# Patient Record
Sex: MALE | Race: ASIAN | NOT HISPANIC OR LATINO | URBAN - METROPOLITAN AREA
[De-identification: names, ages, dates, MRNs, and addresses within clinical notes are randomized per-mention and may not be internally consistent; named-entity substitution may affect disease eponyms.]

---

## 2021-07-24 ENCOUNTER — EMERGENCY (EMERGENCY)
Facility: HOSPITAL | Age: 31
LOS: 1 days | Discharge: HOME | End: 2021-07-24
Attending: EMERGENCY MEDICINE
Payer: COMMERCIAL

## 2021-07-24 VITALS
OXYGEN SATURATION: 99 % | WEIGHT: 145.06 LBS | TEMPERATURE: 98 F | HEART RATE: 94 BPM | RESPIRATION RATE: 16 BRPM | DIASTOLIC BLOOD PRESSURE: 90 MMHG | SYSTOLIC BLOOD PRESSURE: 133 MMHG | HEIGHT: 66 IN

## 2021-07-24 LAB
ALBUMIN SERPL ELPH-MCNC: 4.6 G/DL — SIGNIFICANT CHANGE UP (ref 3.3–5)
ALP SERPL-CCNC: 98 U/L — SIGNIFICANT CHANGE UP (ref 40–120)
ALT FLD-CCNC: 24 U/L — SIGNIFICANT CHANGE UP (ref 10–45)
ANION GAP SERPL CALC-SCNC: 15 MMOL/L — SIGNIFICANT CHANGE UP (ref 5–17)
APPEARANCE UR: CLEAR — SIGNIFICANT CHANGE UP
AST SERPL-CCNC: 20 U/L — SIGNIFICANT CHANGE UP (ref 10–40)
BACTERIA # UR AUTO: NEGATIVE — SIGNIFICANT CHANGE UP
BASOPHILS # BLD AUTO: 0.03 K/UL — SIGNIFICANT CHANGE UP (ref 0–0.2)
BASOPHILS NFR BLD AUTO: 0.4 % — SIGNIFICANT CHANGE UP (ref 0–2)
BILIRUB SERPL-MCNC: 0.4 MG/DL — SIGNIFICANT CHANGE UP (ref 0.2–1.2)
BILIRUB UR-MCNC: NEGATIVE — SIGNIFICANT CHANGE UP
BUN SERPL-MCNC: 24 MG/DL — HIGH (ref 7–23)
CALCIUM SERPL-MCNC: 9.8 MG/DL — SIGNIFICANT CHANGE UP (ref 8.4–10.5)
CHLORIDE SERPL-SCNC: 102 MMOL/L — SIGNIFICANT CHANGE UP (ref 96–108)
CO2 SERPL-SCNC: 25 MMOL/L — SIGNIFICANT CHANGE UP (ref 22–31)
COLOR SPEC: YELLOW — SIGNIFICANT CHANGE UP
CREAT SERPL-MCNC: 1.38 MG/DL — HIGH (ref 0.5–1.3)
DIFF PNL FLD: NEGATIVE — SIGNIFICANT CHANGE UP
EOSINOPHIL # BLD AUTO: 0.43 K/UL — SIGNIFICANT CHANGE UP (ref 0–0.5)
EOSINOPHIL NFR BLD AUTO: 5.3 % — SIGNIFICANT CHANGE UP (ref 0–6)
EPI CELLS # UR: 0 /HPF — SIGNIFICANT CHANGE UP
GLUCOSE SERPL-MCNC: 88 MG/DL — SIGNIFICANT CHANGE UP (ref 70–99)
GLUCOSE UR QL: NEGATIVE — SIGNIFICANT CHANGE UP
HCT VFR BLD CALC: 47.3 % — SIGNIFICANT CHANGE UP (ref 39–50)
HGB BLD-MCNC: 15.4 G/DL — SIGNIFICANT CHANGE UP (ref 13–17)
IMM GRANULOCYTES NFR BLD AUTO: 0.4 % — SIGNIFICANT CHANGE UP (ref 0–1.5)
KETONES UR-MCNC: NEGATIVE — SIGNIFICANT CHANGE UP
LEUKOCYTE ESTERASE UR-ACNC: NEGATIVE — SIGNIFICANT CHANGE UP
LYMPHOCYTES # BLD AUTO: 1.52 K/UL — SIGNIFICANT CHANGE UP (ref 1–3.3)
LYMPHOCYTES # BLD AUTO: 18.7 % — SIGNIFICANT CHANGE UP (ref 13–44)
MCHC RBC-ENTMCNC: 29.7 PG — SIGNIFICANT CHANGE UP (ref 27–34)
MCHC RBC-ENTMCNC: 32.6 GM/DL — SIGNIFICANT CHANGE UP (ref 32–36)
MCV RBC AUTO: 91.3 FL — SIGNIFICANT CHANGE UP (ref 80–100)
MONOCYTES # BLD AUTO: 0.59 K/UL — SIGNIFICANT CHANGE UP (ref 0–0.9)
MONOCYTES NFR BLD AUTO: 7.3 % — SIGNIFICANT CHANGE UP (ref 2–14)
NEUTROPHILS # BLD AUTO: 5.51 K/UL — SIGNIFICANT CHANGE UP (ref 1.8–7.4)
NEUTROPHILS NFR BLD AUTO: 67.9 % — SIGNIFICANT CHANGE UP (ref 43–77)
NITRITE UR-MCNC: NEGATIVE — SIGNIFICANT CHANGE UP
NRBC # BLD: 0 /100 WBCS — SIGNIFICANT CHANGE UP (ref 0–0)
PH UR: 6.5 — SIGNIFICANT CHANGE UP (ref 5–8)
PLATELET # BLD AUTO: 234 K/UL — SIGNIFICANT CHANGE UP (ref 150–400)
POTASSIUM SERPL-MCNC: 3.6 MMOL/L — SIGNIFICANT CHANGE UP (ref 3.5–5.3)
POTASSIUM SERPL-SCNC: 3.6 MMOL/L — SIGNIFICANT CHANGE UP (ref 3.5–5.3)
PROT SERPL-MCNC: 7.7 G/DL — SIGNIFICANT CHANGE UP (ref 6–8.3)
PROT UR-MCNC: ABNORMAL
RBC # BLD: 5.18 M/UL — SIGNIFICANT CHANGE UP (ref 4.2–5.8)
RBC # FLD: 12.2 % — SIGNIFICANT CHANGE UP (ref 10.3–14.5)
RBC CASTS # UR COMP ASSIST: 1 /HPF — SIGNIFICANT CHANGE UP (ref 0–4)
SODIUM SERPL-SCNC: 142 MMOL/L — SIGNIFICANT CHANGE UP (ref 135–145)
SP GR SPEC: 1.04 — HIGH (ref 1.01–1.02)
UROBILINOGEN FLD QL: ABNORMAL
WBC # BLD: 8.11 K/UL — SIGNIFICANT CHANGE UP (ref 3.8–10.5)
WBC # FLD AUTO: 8.11 K/UL — SIGNIFICANT CHANGE UP (ref 3.8–10.5)
WBC UR QL: 1 /HPF — SIGNIFICANT CHANGE UP (ref 0–5)

## 2021-07-24 PROCEDURE — 72100 X-RAY EXAM L-S SPINE 2/3 VWS: CPT | Mod: 26

## 2021-07-24 PROCEDURE — 93971 EXTREMITY STUDY: CPT | Mod: 26,LT

## 2021-07-24 PROCEDURE — 99285 EMERGENCY DEPT VISIT HI MDM: CPT

## 2021-07-24 RX ORDER — LIDOCAINE 4 G/100G
1 CREAM TOPICAL ONCE
Refills: 0 | Status: DISCONTINUED | OUTPATIENT
Start: 2021-07-24 | End: 2021-07-24

## 2021-07-24 RX ORDER — RIVAROXABAN 15 MG-20MG
1 KIT ORAL
Qty: 1 | Refills: 0
Start: 2021-07-24

## 2021-07-24 RX ORDER — KETOROLAC TROMETHAMINE 30 MG/ML
15 SYRINGE (ML) INJECTION ONCE
Refills: 0 | Status: DISCONTINUED | OUTPATIENT
Start: 2021-07-24 | End: 2021-07-24

## 2021-07-24 RX ORDER — LIDOCAINE 4 G/100G
1 CREAM TOPICAL ONCE
Refills: 0 | Status: COMPLETED | OUTPATIENT
Start: 2021-07-24 | End: 2021-07-24

## 2021-07-24 RX ADMIN — Medication 15 MILLIGRAM(S): at 20:27

## 2021-07-24 RX ADMIN — LIDOCAINE 1 PATCH: 4 CREAM TOPICAL at 20:23

## 2021-07-24 NOTE — ED ADULT NURSE NOTE - CHPI ED NUR SYMPTOMS NEG
no anorexia/no bladder dysfunction/no fatigue/no motor function loss/no neck tenderness/no numbness/no tingling

## 2021-07-24 NOTE — ED PROVIDER NOTE - NS ED ROS FT
Gen: Denies fever, chills  CV: Denies chest pain  Skin: Denies rash, erythema  Resp: Denies SOB, cough  ENT: Denies nasal congestion  Eyes: Denies blurry vision  GI: Denies nausea, vomiting, diarrhea  Msk: +lower back pain, LT LE swelling  : Denies dysuria  Neuro: Denies headache

## 2021-07-24 NOTE — ED PROVIDER NOTE - PATIENT PORTAL LINK FT
You can access the FollowMyHealth Patient Portal offered by [facility] by registering at the following website: [email or web address]. By joining [employer]’s FollowMyHealth portal, you will also be able to view your health information using other applications (apps) compatible with our system.

## 2021-07-24 NOTE — ED PROVIDER NOTE - OBJECTIVE STATEMENT
30 yo M with no pmhx presents with 9ds of LT sided back pain radiating to his left leg. No trauma. No hx of similar sxs. Seen by primary care physician - states xr showed stress fracture. Supposed to follow up with a spine specialist - did not go yet. Pain worsened today. Also concerned that he cannot sense when he has to urinate. No incontinence/retention however. Had fever 5ds ago with sore throat - started on abx - not sure about the name. Still taking them. Denies chest pain, shortness of breath, abd pain, n/v. Denies IV drug use, hx of CA. Lifts weight for workout - last workout was July 10th.   Also complains of swelling in LT leg. Travelled to [place] July 11th.

## 2021-07-24 NOTE — ED ADULT TRIAGE NOTE - CHIEF COMPLAINT QUOTE
back pain x 10 days radiating down left leg.  developed swelling and urinary incontinence a few days ago along with a fever.  went to PMD and was told he had stress fracture in spine and had negative covid test

## 2021-07-24 NOTE — ED ADULT NURSE NOTE - OBJECTIVE STATEMENT
31y Male AOx4 with no significant PMH presents to the ED c/o back pain. Pt states he started having back pain about 9 days ago, went to PCP who performed x-ray which showed "stress fracture". Pt states he was referred to see "a spine doctor next Tuesday but I couldn't wait till then". At this time pt c/o back pain radiating down left leg and noticed recent swelling of left leg. Denies recent trauma to area. Endorses recent fever, sore throat, and cough starting 7/20, does not remember name of antibiotic prescribed by PCP. Pt able to void without difficulty but states he feels he can't have full BMs. Currently using cane for ambulation. Sensation of lower extremities intact, palpable pedal pulses b/l. Denies N/V, SOB, chest pain. Spontaneous/unlabored respirations, speaking in full sentences. Side rails up, bed in lowest position, oriented to call bell, safety maintained.

## 2021-07-24 NOTE — ED PROVIDER NOTE - ATTENDING CONTRIBUTION TO CARE
Private Physician Kale Whitt DO PCP/[place],   31y male pmh Neg no dm,HTN,HLD,Covid,Cancer, cva, coronary artery disease, Pt returned from [place] 7/11/21. Pt employed as . Now comes to ed complains of LBP onset 9d ago. rad to left leg, Pt was seen by pmd and dx as Stress fx on plain film, referred to see spine MD next week. Now comes to ed with worsening pain. Pt had fever 4d ago tmax 101,with sore throat, pt was tx w abx, "can't recall name " Has mild cough, regino at night. Pt missed work on day of fever to see md. Pt also c/o not feeling he needs to urinate. Pain worse with moving leg. No buttock anaesthesia PE WDWN male mild distress, Heent normocephalic atraumatic neck supple chest clear anterior & posterior cv no rubs, gallops or murmurs abd soft pelvis stable MSK +slr 30 degrees left leg, distal sensaition/power intact, no obvious swelling. NO LS spine ttp/swelling rash.  Augusto Robbins MD, Facep .

## 2021-07-24 NOTE — ED PROVIDER NOTE - PROGRESS NOTE DETAILS
Endorsed to Dr MARIBEL Robbins MD, Facep Vi Kinsey,  PGY-3: pt is ambulatory - mildly antalgic gait. Return precautions of PE are discussed.

## 2021-07-24 NOTE — ED PROVIDER NOTE - PHYSICAL EXAMINATION
Gen: non toxic appearing, NAD   Head: NC/NT  Eyes:  anicteric  ENT: airway patent, mmm   CV: RRR, +S1/S2, no M/R/G, symmetric pulses   Resp: CTAB, symmetric breath sounds   GI:  abdomen soft non-distended, NTTP   Back: no spinal ttp, no mass appreciated, no increased warmth to touch  Extremities - subtle swelling noted to LT leg  Skin: no rashes, colors changes or bruising of back  Neuro: A&Ox4, following commands, speech clear, moving all four extremities spontaneously, 5/5 strength, sensation intact  Psych: appropriate mood, normal insight  Rectal tone intact - chaperone Kathy Hinojosa RN

## 2021-07-24 NOTE — ED ADULT NURSE REASSESSMENT NOTE - NS ED NURSE REASSESS COMMENT FT1
10 minutes after voiding, bladder scanned pt per MD request. Pt is retaining 26mL of urine. MD made aware

## 2021-07-24 NOTE — ED ADULT NURSE NOTE - NSIMPLEMENTINTERV_GEN_ALL_ED
Implemented All Fall Risk Interventions:  [place] to call system. Call bell, personal items and telephone within reach. Instruct patient to call for assistance. Room bathroom lighting operational. Non-slip footwear when patient is off stretcher. Physically safe environment: no spills, clutter or unnecessary equipment. Stretcher in lowest position, wheels locked, appropriate side rails in place. Provide visual cue, wrist band, yellow gown, etc. Monitor gait and stability. Monitor for mental status changes and reorient to person, place, and time. Review medications for side effects contributing to fall risk. Reinforce activity limits and safety measures with patient and family.

## 2021-07-24 NOTE — ED PROVIDER NOTE - NSFOLLOWUPINSTRUCTIONS_ED_ALL_ED_FT
You were seen for pain in your leg. You have a clot in your leg. Please take the blood thinner as prescribed.     Your creatinine is slightly elevated - please have your primary care physician repeat your labs.     Your results are attached with your discharge instructions, please review them with your primary care physician. If there is a result pending, you will receive a call if test is positive.    A presumptive diagnosis is made today, but further evaluation may be required by your primary care doctor and/or specialist for a definitive diagnosis. Therefore, follow up as directed and if symptoms change/worsen or any emergency conditions, please return to the ER.      If needed, call patient access services at [phone] to find a primary care doctor, or call at [phone] to make an appointment at the clinic.      Deep vein thrombosis (DVT) is a condition in which a blood clot forms in a deep vein, such as a lower leg, thigh, or arm vein.  Symptoms can include swelling, warmth, pain, and redness in your leg or arm.  This condition may be treated with a blood thinner (anticoagulant medicine), medicine that is injected to dissolve blood clots,compression stockings, or surgery.  If you are prescribed blood thinners, take them exactly as told.  Get help right away if:  You have:  New or increased pain, swelling, or redness in an arm or leg.  Numbness or tingling in an arm or leg.  Shortness of breath.  Chest pain.  A rapid or irregular heartbeat.  A severe headache or confusion.  A cut that will not stop bleeding.  There is blood in your vomit, stool, or urine.  You have a serious fall or accident, or you hit your head.  You feel light-headed or dizzy.  You cough up blood.    These symptoms may represent a serious problem that is an emergency. Do not wait to see if the symptoms will go away. Get medical help right away. Call your local emergency services (911 in the [place] Do not drive yourself to the hospital.

## 2021-07-25 VITALS
TEMPERATURE: 98 F | SYSTOLIC BLOOD PRESSURE: 124 MMHG | RESPIRATION RATE: 17 BRPM | HEART RATE: 83 BPM | DIASTOLIC BLOOD PRESSURE: 72 MMHG | OXYGEN SATURATION: 96 %

## 2021-07-25 LAB — SARS-COV-2 RNA SPEC QL NAA+PROBE: SIGNIFICANT CHANGE UP

## 2021-07-25 PROCEDURE — U0003: CPT

## 2021-07-25 PROCEDURE — U0005: CPT

## 2021-07-25 PROCEDURE — 85025 COMPLETE CBC W/AUTO DIFF WBC: CPT

## 2021-07-25 PROCEDURE — 96374 THER/PROPH/DIAG INJ IV PUSH: CPT

## 2021-07-25 PROCEDURE — 93971 EXTREMITY STUDY: CPT

## 2021-07-25 PROCEDURE — 99284 EMERGENCY DEPT VISIT MOD MDM: CPT | Mod: 25

## 2021-07-25 PROCEDURE — 80053 COMPREHEN METABOLIC PANEL: CPT

## 2021-07-25 PROCEDURE — 81001 URINALYSIS AUTO W/SCOPE: CPT

## 2021-07-25 PROCEDURE — 72100 X-RAY EXAM L-S SPINE 2/3 VWS: CPT

## 2021-07-25 RX ORDER — RIVAROXABAN 15 MG-20MG
15 KIT ORAL ONCE
Refills: 0 | Status: COMPLETED | OUTPATIENT
Start: 2021-07-25 | End: 2021-07-25

## 2021-07-25 RX ADMIN — RIVAROXABAN 15 MILLIGRAM(S): KIT at 01:17

## 2021-07-25 NOTE — ED POST DISCHARGE NOTE - DETAILS
7/25: spoke with patient regarding overread, has an appointment with spine on tuesday, wrote down results so he can discus the need for additional imaging with the spine physician - Ella Olvera PA-C

## 2021-08-01 ENCOUNTER — INPATIENT (INPATIENT)
Facility: HOSPITAL | Age: 31
LOS: 4 days | Discharge: HOME | DRG: 176 | End: 2021-08-06
Attending: INTERNAL MEDICINE | Admitting: INTERNAL MEDICINE
Payer: COMMERCIAL

## 2021-08-01 VITALS
HEIGHT: 66 IN | TEMPERATURE: 98 F | OXYGEN SATURATION: 100 % | RESPIRATION RATE: 18 BRPM | HEART RATE: 88 BPM | DIASTOLIC BLOOD PRESSURE: 77 MMHG | WEIGHT: 145.06 LBS | SYSTOLIC BLOOD PRESSURE: 117 MMHG

## 2021-08-01 DIAGNOSIS — I26.99 OTHER PULMONARY EMBOLISM WITHOUT ACUTE COR PULMONALE: ICD-10-CM

## 2021-08-01 PROBLEM — Z92.29 PERSONAL HISTORY OF OTHER DRUG THERAPY: Chronic | Status: ACTIVE | Noted: 2021-07-24

## 2021-08-01 LAB
ALBUMIN SERPL ELPH-MCNC: 4.2 G/DL — SIGNIFICANT CHANGE UP (ref 3.3–5)
ALP SERPL-CCNC: 103 U/L — SIGNIFICANT CHANGE UP (ref 40–120)
ALT FLD-CCNC: 22 U/L — SIGNIFICANT CHANGE UP (ref 10–45)
ANION GAP SERPL CALC-SCNC: 12 MMOL/L — SIGNIFICANT CHANGE UP (ref 5–17)
APTT BLD: 47.2 SEC — HIGH (ref 27.5–35.5)
AST SERPL-CCNC: 25 U/L — SIGNIFICANT CHANGE UP (ref 10–40)
BASOPHILS # BLD AUTO: 0.04 K/UL — SIGNIFICANT CHANGE UP (ref 0–0.2)
BASOPHILS NFR BLD AUTO: 0.5 % — SIGNIFICANT CHANGE UP (ref 0–2)
BILIRUB SERPL-MCNC: 0.3 MG/DL — SIGNIFICANT CHANGE UP (ref 0.2–1.2)
BUN SERPL-MCNC: 18 MG/DL — SIGNIFICANT CHANGE UP (ref 7–23)
CALCIUM SERPL-MCNC: 10.1 MG/DL — SIGNIFICANT CHANGE UP (ref 8.4–10.5)
CHLORIDE SERPL-SCNC: 103 MMOL/L — SIGNIFICANT CHANGE UP (ref 96–108)
CO2 SERPL-SCNC: 26 MMOL/L — SIGNIFICANT CHANGE UP (ref 22–31)
CREAT SERPL-MCNC: 1.01 MG/DL — SIGNIFICANT CHANGE UP (ref 0.5–1.3)
EOSINOPHIL # BLD AUTO: 0.23 K/UL — SIGNIFICANT CHANGE UP (ref 0–0.5)
EOSINOPHIL NFR BLD AUTO: 3 % — SIGNIFICANT CHANGE UP (ref 0–6)
GLUCOSE SERPL-MCNC: 94 MG/DL — SIGNIFICANT CHANGE UP (ref 70–99)
HCT VFR BLD CALC: 44.3 % — SIGNIFICANT CHANGE UP (ref 39–50)
HGB BLD-MCNC: 14.7 G/DL — SIGNIFICANT CHANGE UP (ref 13–17)
IMM GRANULOCYTES NFR BLD AUTO: 0.5 % — SIGNIFICANT CHANGE UP (ref 0–1.5)
INR BLD: 1.32 RATIO — HIGH (ref 0.88–1.16)
LYMPHOCYTES # BLD AUTO: 1.24 K/UL — SIGNIFICANT CHANGE UP (ref 1–3.3)
LYMPHOCYTES # BLD AUTO: 16.1 % — SIGNIFICANT CHANGE UP (ref 13–44)
MAGNESIUM SERPL-MCNC: 2.2 MG/DL — SIGNIFICANT CHANGE UP (ref 1.6–2.6)
MCHC RBC-ENTMCNC: 30.4 PG — SIGNIFICANT CHANGE UP (ref 27–34)
MCHC RBC-ENTMCNC: 33.2 GM/DL — SIGNIFICANT CHANGE UP (ref 32–36)
MCV RBC AUTO: 91.7 FL — SIGNIFICANT CHANGE UP (ref 80–100)
MONOCYTES # BLD AUTO: 0.41 K/UL — SIGNIFICANT CHANGE UP (ref 0–0.9)
MONOCYTES NFR BLD AUTO: 5.3 % — SIGNIFICANT CHANGE UP (ref 2–14)
NEUTROPHILS # BLD AUTO: 5.76 K/UL — SIGNIFICANT CHANGE UP (ref 1.8–7.4)
NEUTROPHILS NFR BLD AUTO: 74.6 % — SIGNIFICANT CHANGE UP (ref 43–77)
NRBC # BLD: 0 /100 WBCS — SIGNIFICANT CHANGE UP (ref 0–0)
PHOSPHATE SERPL-MCNC: 3.6 MG/DL — SIGNIFICANT CHANGE UP (ref 2.5–4.5)
PLATELET # BLD AUTO: 353 K/UL — SIGNIFICANT CHANGE UP (ref 150–400)
POTASSIUM SERPL-MCNC: 4.2 MMOL/L — SIGNIFICANT CHANGE UP (ref 3.5–5.3)
POTASSIUM SERPL-SCNC: 4.2 MMOL/L — SIGNIFICANT CHANGE UP (ref 3.5–5.3)
PROT SERPL-MCNC: 7.5 G/DL — SIGNIFICANT CHANGE UP (ref 6–8.3)
PROTHROM AB SERPL-ACNC: 15.6 SEC — HIGH (ref 10.6–13.6)
RBC # BLD: 4.83 M/UL — SIGNIFICANT CHANGE UP (ref 4.2–5.8)
RBC # FLD: 11.7 % — SIGNIFICANT CHANGE UP (ref 10.3–14.5)
SODIUM SERPL-SCNC: 141 MMOL/L — SIGNIFICANT CHANGE UP (ref 135–145)
TROPONIN T, HIGH SENSITIVITY RESULT: <6 NG/L — SIGNIFICANT CHANGE UP (ref 0–51)
WBC # BLD: 7.72 K/UL — SIGNIFICANT CHANGE UP (ref 3.8–10.5)
WBC # FLD AUTO: 7.72 K/UL — SIGNIFICANT CHANGE UP (ref 3.8–10.5)

## 2021-08-01 PROCEDURE — 71275 CT ANGIOGRAPHY CHEST: CPT | Mod: 26,MA

## 2021-08-01 PROCEDURE — 71046 X-RAY EXAM CHEST 2 VIEWS: CPT | Mod: 26

## 2021-08-01 PROCEDURE — 99291 CRITICAL CARE FIRST HOUR: CPT | Mod: 25

## 2021-08-01 PROCEDURE — 93010 ELECTROCARDIOGRAM REPORT: CPT

## 2021-08-01 RX ORDER — PANTOPRAZOLE SODIUM 20 MG/1
40 TABLET, DELAYED RELEASE ORAL
Refills: 0 | Status: DISCONTINUED | OUTPATIENT
Start: 2021-08-01 | End: 2021-08-06

## 2021-08-01 RX ORDER — ENOXAPARIN SODIUM 100 MG/ML
70 INJECTION SUBCUTANEOUS EVERY 12 HOURS
Refills: 0 | Status: DISCONTINUED | OUTPATIENT
Start: 2021-08-01 | End: 2021-08-06

## 2021-08-01 RX ADMIN — ENOXAPARIN SODIUM 70 MILLIGRAM(S): 100 INJECTION SUBCUTANEOUS at 23:39

## 2021-08-01 NOTE — H&P ADULT - PROBLEM SELECTOR PLAN 2
recently diagnosed with DVT , has long flight to [place] and then he develop DVT   was started on Xeralto since last Monday : says he didn't miss any dose  he only had venous doppler during that visit which was positive for DVT , now I'm not sure that his pul embolism was present during last week or not? though that time patient didn't have any SOB or pleuritic chest pain and today he develop that.   so this could be failure of Ac ( Xarelto ) and now he may need coumadin   will d/w hematology in am

## 2021-08-01 NOTE — ED ADULT NURSE NOTE - NSIMPLEMENTINTERV_GEN_ALL_ED
Implemented All Universal Safety Interventions:  [place] to call system. Call bell, personal items and telephone within reach. Instruct patient to call for assistance. Room bathroom lighting operational. Non-slip footwear when patient is off stretcher. Physically safe environment: no spills, clutter or unnecessary equipment. Stretcher in lowest position, wheels locked, appropriate side rails in place.

## 2021-08-01 NOTE — H&P ADULT - HISTORY OF PRESENT ILLNESS
31 M hx diagnosed provoked DVT (travelling, spent 24hrs stationary in airport otw to [place]) p/w SOB and mild chest discomfort since last night. Chest discomfort subcostal "need to take a deep breath". No diaphoresis/ N/V. No radiation of chest discomfort. No change with exertion. No cough, fevers, or chills. Pain in leg has been improving, but walking with a limp. Leg does not feel cold. Has been compliant with zarelto, albeit started it late d/t delay in obtaining it from pharmacy. No syncope, lightheaded.    He also notes a sore throat for the past week, odynophagia that was being treated w/ augmentin (day 2), noted a white spot on L tonsil. He endorses drinking some beers prior to onset of shortness of breath. Does endorse daily vaping.

## 2021-08-01 NOTE — ED PROVIDER NOTE - PROGRESS NOTE DETAILS
Attending MD Brown : Rads called with right subsegmental PE. Will place on heparin gtt and TBA. Patietn continues to be hemodynamically stable.

## 2021-08-01 NOTE — ED ADULT NURSE NOTE - OBJECTIVE STATEMENT
Pt. is a 32 yo M who came to the Ed amb c/o sob since last night with mild chest discomfort. States he feels like he "needs to take a deep breath". No dizziness/lightheadedness, no palpitations, no fever/chills/cough. Dx with L leg dvt on 6/24 after long plane travel and layover. Lt leg pain is improving but he is walking with a limp. Pt. on Xarelto. A/O x3.

## 2021-08-01 NOTE — H&P ADULT - NSHPPHYSICALEXAM_GEN_ALL_CORE
pt. seen and examined , NAD    Vital Signs Last 24 Hrs  T(C): 37 (01 Aug 2021 23:35), Max: 37 (01 Aug 2021 23:35)  T(F): 98.6 (01 Aug 2021 23:35), Max: 98.6 (01 Aug 2021 23:35)  HR: 70 (01 Aug 2021 23:35) (64 - 88)  BP: 129/80 (01 Aug 2021 23:35) (117/77 - 136/85)  BP(mean): --  RR: 18 (01 Aug 2021 23:35) (16 - 19)  SpO2: 93% (01 Aug 2021 23:35) (93% - 100%)    heent: nc/at, no pallor  neck: supple, no JVD  lungs : B/L clear, no w/r/r  heart: s1s2 nml  abd: soft, NABS, NT/ND  ext: no e/c/c, pulses 2+  neuro: aaox3 , no focal deficit  skin : warm , no rash

## 2021-08-01 NOTE — H&P ADULT - NSICDXPASTMEDICALHX_GEN_ALL_CORE_FT
PAST MEDICAL HISTORY:  COVID-19 vaccine series completed 6/2/21    DVT (deep venous thrombosis)

## 2021-08-01 NOTE — ED PROVIDER NOTE - PHYSICAL EXAMINATION
Attending MD Brown :   PHYSICAL EXAM:    GENERAL: NAD. Comfortable.   HEENT:  Atraumatic  CHEST/LUNG: Chest rise equal bilaterally. No increased WOB. No tachypnea. CTAB.   HEART: Regular rate and rhythm. No murmurs or rubs. No tachycardia.   ABDOMEN: Soft, Nontender, Nondistended  EXTREMITIES:  Extremities warm. BLE equal without erythema. Pulses intact.   PSYCH: A&Ox3  SKIN: No obvious rashes or lesions Attending MD Brown :   PHYSICAL EXAM:    GENERAL: NAD. Comfortable.   HEENT:  Atraumatic  ENT: white nonexudative area on L tonsil  CHEST/LUNG: Chest rise equal bilaterally. No increased WOB. No tachypnea. CTAB.   HEART: Regular rate and rhythm. No murmurs or rubs. No tachycardia.   ABDOMEN: Soft, Nontender, Nondistended  EXTREMITIES:  Extremities warm. BLE equal without erythema. Pulses intact.   PSYCH: A&Ox3  SKIN: No obvious rashes or lesions

## 2021-08-01 NOTE — ED ADULT NURSE REASSESSMENT NOTE - NS ED NURSE REASSESS COMMENT FT1
Received report from DEANNA Calabrese. Pt presents to ED c/o of chest pain. Pt has recent DVT, on blood thinners. Pt found to have PE. VSS. AOX4. Admitted to medicine, pending bed assignment.

## 2021-08-01 NOTE — H&P ADULT - PROBLEM SELECTOR PLAN 1
CT angio chest shows pul embolism   pt. was started on xarelto since monday . says he didn't miss single dose   TTE  consult hematology : called Jocelyn Cantor   started on full dose lovenox 70 mg sq q 12   hold xarelto

## 2021-08-01 NOTE — H&P ADULT - NSHPADDITIONALINFOADULT_GEN_ALL_CORE
advance Care planning : d/w patient regarding advance directive. d/whim regarding intubation , CPR if the need arise. he agrees for everything. remain full code. time spend 15 min

## 2021-08-01 NOTE — ED PROVIDER NOTE - CLINICAL SUMMARY MEDICAL DECISION MAKING FREE TEXT BOX
Attending MD Brown : 31 M p/w SOB after diagnosis of DVT. Will obtain CTPE given new onset of SOB c/f failure of xarelto. trop to eval ACS/ right heart strain. Will reassess. 31M w/ known recent DVT compliant w/ xarelto p/w sudden shortness of breath concerning for PE. CTA to r/o PE, if positive will need admission for hep gtt. Trop to eval for RHS.     Attending MD Brown : 31 M p/w SOB after diagnosis of DVT. Will obtain CTPE given new onset of SOB c/f failure of xarelto. trop to eval ACS/ right heart strain. Will reassess.

## 2021-08-01 NOTE — ED PROVIDER NOTE - OBJECTIVE STATEMENT
Attending MD Brown : 31 M hx diagnosed provoked DVT (travelling, spent 24hrs stationary in airport otw to [place]) p/w SOb and mild chest discomfort since last night. Chest discomfort subcostal "need to take a deep breath". No diaphoresis/ N/V. No radiation of chest discomfort. No change with exertion. No cough, fevers, or chills. Pain in leg has been improving, but walking with a limp. Leg does not feel cold. Has been compliant with zarelto, albeit started it late d/t delay in obtaining it from pharmacy. No syncope, lightheaded. Attending MD Brown : 31 M hx diagnosed provoked DVT (travelling, spent 24hrs stationary in airport otw to [place]) p/w SOb and mild chest discomfort since last night. Chest discomfort subcostal "need to take a deep breath". No diaphoresis/ N/V. No radiation of chest discomfort. No change with exertion. No cough, fevers, or chills. Pain in leg has been improving, but walking with a limp. Leg does not feel cold. Has been compliant with zarelto, albeit started it late d/t delay in obtaining it from pharmacy. No syncope, lightheaded.    He also notes a sore throat for the past week, odynophagia that was being treated w/ augmentin (day 2), noted a white spot on L tonsil. He endorses drinking some beers prior to onset of shortness of breath. Attending MD Brown : 31 M hx diagnosed provoked DVT (travelling, spent 24hrs stationary in airport otw to [place]) p/w SOb and mild chest discomfort since last night. Chest discomfort subcostal "need to take a deep breath". No diaphoresis/ N/V. No radiation of chest discomfort. No change with exertion. No cough, fevers, or chills. Pain in leg has been improving, but walking with a limp. Leg does not feel cold. Has been compliant with zarelto, albeit started it late d/t delay in obtaining it from pharmacy. No syncope, lightheaded.    He also notes a sore throat for the past week, odynophagia that was being treated w/ augmentin (day 2), noted a white spot on L tonsil. He endorses drinking some beers prior to onset of shortness of breath. Does endorse daily vaping.

## 2021-08-01 NOTE — H&P ADULT - NSHPLABSRESULTS_GEN_ALL_CORE
14.7   7.72  )-----------( 353      ( 01 Aug 2021 16:50 )             44.3     08-01    141  |  103  |  18  ----------------------------<  94  4.2   |  26  |  1.01    Ca    10.1      01 Aug 2021 16:50  Phos  3.6     08-01  Mg     2.2     08-01    TPro  7.5  /  Alb  4.2  /  TBili  0.3  /  DBili  x   /  AST  25  /  ALT  22  /  AlkPhos  103  08-01

## 2021-08-02 DIAGNOSIS — I82.409 ACUTE EMBOLISM AND THROMBOSIS OF UNSPECIFIED DEEP VEINS OF UNSPECIFIED LOWER EXTREMITY: ICD-10-CM

## 2021-08-02 DIAGNOSIS — I26.99 OTHER PULMONARY EMBOLISM WITHOUT ACUTE COR PULMONALE: ICD-10-CM

## 2021-08-02 LAB
ALBUMIN SERPL ELPH-MCNC: 4.3 G/DL — SIGNIFICANT CHANGE UP (ref 3.3–5)
ALP SERPL-CCNC: 96 U/L — SIGNIFICANT CHANGE UP (ref 40–120)
ALT FLD-CCNC: 24 U/L — SIGNIFICANT CHANGE UP (ref 10–45)
ANION GAP SERPL CALC-SCNC: 11 MMOL/L — SIGNIFICANT CHANGE UP (ref 5–17)
AST SERPL-CCNC: 20 U/L — SIGNIFICANT CHANGE UP (ref 10–40)
BILIRUB SERPL-MCNC: 0.3 MG/DL — SIGNIFICANT CHANGE UP (ref 0.2–1.2)
BUN SERPL-MCNC: 20 MG/DL — SIGNIFICANT CHANGE UP (ref 7–23)
CALCIUM SERPL-MCNC: 9.8 MG/DL — SIGNIFICANT CHANGE UP (ref 8.4–10.5)
CHLORIDE SERPL-SCNC: 104 MMOL/L — SIGNIFICANT CHANGE UP (ref 96–108)
CO2 SERPL-SCNC: 26 MMOL/L — SIGNIFICANT CHANGE UP (ref 22–31)
COVID-19 SPIKE DOMAIN AB INTERP: POSITIVE
COVID-19 SPIKE DOMAIN ANTIBODY RESULT: >250 U/ML — HIGH
CREAT SERPL-MCNC: 1 MG/DL — SIGNIFICANT CHANGE UP (ref 0.5–1.3)
GLUCOSE SERPL-MCNC: 91 MG/DL — SIGNIFICANT CHANGE UP (ref 70–99)
HCT VFR BLD CALC: 45.3 % — SIGNIFICANT CHANGE UP (ref 39–50)
HGB BLD-MCNC: 14.7 G/DL — SIGNIFICANT CHANGE UP (ref 13–17)
MCHC RBC-ENTMCNC: 29.8 PG — SIGNIFICANT CHANGE UP (ref 27–34)
MCHC RBC-ENTMCNC: 32.5 GM/DL — SIGNIFICANT CHANGE UP (ref 32–36)
MCV RBC AUTO: 91.9 FL — SIGNIFICANT CHANGE UP (ref 80–100)
NRBC # BLD: 0 /100 WBCS — SIGNIFICANT CHANGE UP (ref 0–0)
PLATELET # BLD AUTO: 367 K/UL — SIGNIFICANT CHANGE UP (ref 150–400)
POTASSIUM SERPL-MCNC: 3.9 MMOL/L — SIGNIFICANT CHANGE UP (ref 3.5–5.3)
POTASSIUM SERPL-SCNC: 3.9 MMOL/L — SIGNIFICANT CHANGE UP (ref 3.5–5.3)
PROT SERPL-MCNC: 7.7 G/DL — SIGNIFICANT CHANGE UP (ref 6–8.3)
RBC # BLD: 4.93 M/UL — SIGNIFICANT CHANGE UP (ref 4.2–5.8)
RBC # FLD: 11.9 % — SIGNIFICANT CHANGE UP (ref 10.3–14.5)
SARS-COV-2 IGG+IGM SERPL QL IA: >250 U/ML — HIGH
SARS-COV-2 IGG+IGM SERPL QL IA: POSITIVE
SARS-COV-2 RNA SPEC QL NAA+PROBE: SIGNIFICANT CHANGE UP
SODIUM SERPL-SCNC: 141 MMOL/L — SIGNIFICANT CHANGE UP (ref 135–145)
WBC # BLD: 7.05 K/UL — SIGNIFICANT CHANGE UP (ref 3.8–10.5)
WBC # FLD AUTO: 7.05 K/UL — SIGNIFICANT CHANGE UP (ref 3.8–10.5)

## 2021-08-02 RX ORDER — WARFARIN SODIUM 2.5 MG/1
5 TABLET ORAL ONCE
Refills: 0 | Status: COMPLETED | OUTPATIENT
Start: 2021-08-02 | End: 2021-08-02

## 2021-08-02 RX ORDER — BENZOCAINE AND MENTHOL 5; 1 G/100ML; G/100ML
1 LIQUID ORAL EVERY 8 HOURS
Refills: 0 | Status: DISCONTINUED | OUTPATIENT
Start: 2021-08-02 | End: 2021-08-06

## 2021-08-02 RX ADMIN — PANTOPRAZOLE SODIUM 40 MILLIGRAM(S): 20 TABLET, DELAYED RELEASE ORAL at 05:35

## 2021-08-02 RX ADMIN — WARFARIN SODIUM 5 MILLIGRAM(S): 2.5 TABLET ORAL at 22:31

## 2021-08-02 RX ADMIN — ENOXAPARIN SODIUM 70 MILLIGRAM(S): 100 INJECTION SUBCUTANEOUS at 10:28

## 2021-08-02 RX ADMIN — BENZOCAINE AND MENTHOL 1 LOZENGE: 5; 1 LIQUID ORAL at 13:39

## 2021-08-02 RX ADMIN — ENOXAPARIN SODIUM 70 MILLIGRAM(S): 100 INJECTION SUBCUTANEOUS at 22:31

## 2021-08-02 NOTE — CONSULT NOTE ADULT - ASSESSMENT
31 M hx diagnosed provoked DVT (travelling, spent 24hrs stationary in airport otw to [place]) p/w SOB and mild chest discomfort since last night. Chest discomfort subcostal "need to take a deep breath". No diaphoresis/ N/V. No radiation of chest discomfort. No change with exertion. No cough, fevers, or chills. Pain in leg has been improving, but walking with a limp. Leg does not feel cold. Has been compliant with xarelto, albeit started it late d/t delay in obtaining it from pharmacy. No syncope, lightheaded.    He also notes a sore throat for the past week, odynophagia that was being treated w/ augmentin (day 2), noted a white spot on L tonsil. He endorses drinking some beers prior to onset of shortness of breath. Does endorse daily vaping. (01 Aug 2021 21:10)    Hematology/Oncology consulted to assess patient who had a pulmonary embolus diagnosed 1 week after having had a provoked DVT (diagnosed 7/24/2021). No prior or family history of blood clots.     Pulmonary Embolus  --Unclear if this is failure of anticoagulation (Xarelto) or if PE was previously present (CTA was not done on 7/24)  --Would transition Heparin to Warfarin to maintain INR 2.5-3 - please start 5 mg PO and titrate as needed  --Will order anticardiolipin antibody and beta-2 gylcoprotein (although may be false positive from Xarelto)  --Would also recommend TTE to rule out PFO    After discharge patient may continue care with Dr. Ramon Schmidt of [facility]    Thank you for the opportunity to participate in MR. Mclain's care.    Paul Prado PA-C  Hematology/Oncology  [facility]   [phone] (cell)  [phone] (office)  [phone] (alt office)  Evenings and weekends please call MD on call or office

## 2021-08-02 NOTE — PATIENT PROFILE ADULT - NSPROIMPLANTSMEDDEV_GEN_A_NUR
Pt rates pain improved = 3 on scale of 1 to 10 to right breast area.  Dressing to right breast = D&I with no drainage noted.  VSS.  Pt tolerated po intake with no n/v.  Pt voided x1 prior to discharge home.  Discharge instructions reviewed with the patient and family - verbalize understanding.  Wheeled the patient to the car.   None

## 2021-08-02 NOTE — CONSULT NOTE ADULT - SUBJECTIVE AND OBJECTIVE BOX
CHIEF COMPLAINT:Patient is a 31y old  Male who presents with a chief complaint of SOB , pleuritic chest pain (01 Aug 2021 21:10)      HISTORY OF PRESENT ILLNESS:HPI:   31 M hx diagnosed provoked DVT (travelling, spent 24hrs stationary in airport otw to [place]) p/w SOB and mild chest discomfort since last night. Chest discomfort subcostal "need to take a deep breath". No diaphoresis/ N/V. No radiation of chest discomfort. No change with exertion. No cough, fevers, or chills. Pain in leg has been improving, but walking with a limp. Leg does not feel cold. Has been compliant with zarelto, albeit started it late d/t delay in obtaining it from pharmacy. No syncope, lightheaded.    He also notes a sore throat for the past week, odynophagia that was being treated w/ augmentin (day 2), noted a white spot on L tonsil. He endorses drinking some beers prior to onset of shortness of breath. Does endorse daily vaping. (01 Aug 2021 21:10)      PAST MEDICAL & SURGICAL HISTORY:  COVID-19 vaccine series completed  6/2/21  DVT (deep venous thrombosis)  No significant past surgical history      MEDICATIONS:  enoxaparin Injectable 70 milliGRAM(s) SubCutaneous every 12 hours  pantoprazole    Tablet 40 milliGRAM(s) Oral before breakfast    FAMILY HISTORY:  No pertinent family history in first degree relatives  Non-contributory    SOCIAL HISTORY:    [ ] Tobacco  [ ] Drugs  [ ] Alcohol    Allergies    No Known Allergies    Intolerances    	    REVIEW OF SYSTEMS:  CONSTITUTIONAL: No fever  EYES: No eye pain, visual disturbances, or discharge  ENMT:  No difficulty hearing, tinnitus  NECK: No pain or stiffness  RESPIRATORY: No cough, wheezing,  CARDIOVASCULAR: + pleuritic chest pain, palpitations, passing out, dizziness, or leg swelling  GASTROINTESTINAL:  No nausea, vomiting, diarrhea or constipation. No melena.  GENITOURINARY: No dysuria, hematuria  NEUROLOGICAL: No stroke like symptoms  SKIN: No burning or lesions   ENDOCRINE: No heat or cold intolerance  MUSCULOSKELETAL: No joint pain or swelling  PSYCHIATRIC: No  anxiety, mood swings  HEME/LYMPH: No bleeding gums  ALLERGY AND IMMUNOLOGIC: No hives or eczema	    All other ROS negative    PHYSICAL EXAM:  T(C): 36.6 (08-02-21 @ 10:10), Max: 37.1 (08-02-21 @ 09:38)  HR: 80 (08-02-21 @ 10:10) (64 - 88)  BP: 134/66 (08-02-21 @ 10:10) (104/63 - 136/85)  RR: 18 (08-02-21 @ 10:10) (16 - 19)  SpO2: 99% (08-02-21 @ 10:10) (93% - 100%)  Wt(kg): --  I&O's Summary      Appearance: Normal	  HEENT:   Normal oral mucosa, EOMI	  Cardiovascular:  S1 S2, No JVD,    Respiratory: Lungs clear to auscultation	  Psychiatry: Alert  Gastrointestinal:  Soft, Non-tender, + BS	  Skin: No rashes   Neurologic: Non-focal  Extremities:  No edema  Vascular: Peripheral pulses palpable    	  	  CARDIAC MARKERS:  Labs personally reviewed by me                        14.7   7.05  )-----------( 367      ( 02 Aug 2021 06:50 )             45.3     08-02    141  |  104  |  20  ----------------------------<  91  3.9   |  26  |  1.00    Ca    9.8      02 Aug 2021 06:50  Phos  3.6     08-01  Mg     2.2     08-01    TPro  7.7  /  Alb  4.3  /  TBili  0.3  /  DBili  x   /  AST  20  /  ALT  24  /  AlkPhos  96  08-02  EKG: Personally reviewed by me -   Radiology: Personally reviewed by me -   < from: CT Angio Chest PE Protocol w/ IV Cont (08.01.21 @ 17:00) >  IMPRESSION: Pulmonary emboli within the subsegmental branches of the posterior segment of the right lower lobe pulmonary artery.    The above finding was communicated to Dr Richmond on 8/1/2021 at 5:29 PM.      < end of copied text >  < from: Xray Chest 2 Views PA/Lat (08.01.21 @ 16:53) >  IMPRESSION:  Clear lungs. Correlate with CTscan that was performed the same date.      < end of copied text >  Assessment /Plan:    31 M hx diagnosed provoked DVT (travelling, spent 24hrs stationary in airport otw to [place]) p/w SOB and mild chest discomfort since last night. Chest discomfort subcostal "need to take a deep breath". No diaphoresis/ N/V. No radiation of chest discomfort. No change with exertion.    Problem/Plan - 1:  ·  Problem: Pulmonary embolus.  Plan: CT angio + PE   -pending TTE to assess for RV dysfunction   - has been on Xarelto since last week for DVT   - f/u heme consult   ac with full dose lovenox     Problem/Plan - 2:  ·  Problem: Deep vein thrombosis. provoked by long flight  has been on xarelto   on full dose lovenox ac   - f/u heme onc reccs  pending echo     Problem/Plan - 3:  ·  Problem: GI ppx       Misa Serrano ANP-C  Giovanni Agosto DO [facility]  Cardiovascular Medicine  [address] [place] [address], [address]  Office [phone]  Cell [phone] CHIEF COMPLAINT:Patient is a 31y old  Male who presents with a chief complaint of SOB , pleuritic chest pain (01 Aug 2021 21:10)      HISTORY OF PRESENT ILLNESS:HPI:   31 M hx diagnosed provoked DVT (travelling, spent 24hrs stationary in airport otw to [place]) p/w SOB and mild chest discomfort since last night. Chest discomfort subcostal "need to take a deep breath". No diaphoresis/ N/V. No radiation of chest discomfort. No change with exertion. No cough, fevers, or chills. Pain in leg has been improving, but walking with a limp. Leg does not feel cold. Has been compliant with zarelto, albeit started it late d/t delay in obtaining it from pharmacy. No syncope, lightheaded.    He also notes a sore throat for the past week, odynophagia that was being treated w/ augmentin (day 2), noted a white spot on L tonsil. He endorses drinking some beers prior to onset of shortness of breath. Does endorse daily vaping. (01 Aug 2021 21:10)      PAST MEDICAL & SURGICAL HISTORY:  COVID-19 vaccine series completed  6/2/21  DVT (deep venous thrombosis)  No significant past surgical history      MEDICATIONS:  enoxaparin Injectable 70 milliGRAM(s) SubCutaneous every 12 hours  pantoprazole    Tablet 40 milliGRAM(s) Oral before breakfast    FAMILY HISTORY:  No pertinent family history in first degree relatives  Non-contributory    SOCIAL HISTORY:    [ ] Tobacco  [ ] Drugs  [ ] Alcohol    Allergies    No Known Allergies    Intolerances    	    REVIEW OF SYSTEMS:  CONSTITUTIONAL: No fever  EYES: No eye pain, visual disturbances, or discharge  ENMT:  No difficulty hearing, tinnitus  NECK: No pain or stiffness  RESPIRATORY: No cough, wheezing,  CARDIOVASCULAR: + pleuritic chest pain, palpitations, passing out, dizziness, or leg swelling  GASTROINTESTINAL:  No nausea, vomiting, diarrhea or constipation. No melena.  GENITOURINARY: No dysuria, hematuria  NEUROLOGICAL: No stroke like symptoms  SKIN: No burning or lesions   ENDOCRINE: No heat or cold intolerance  MUSCULOSKELETAL: No joint pain or swelling  PSYCHIATRIC: No  anxiety, mood swings  HEME/LYMPH: No bleeding gums  ALLERGY AND IMMUNOLOGIC: No hives or eczema	    All other ROS negative    PHYSICAL EXAM:  T(C): 36.6 (08-02-21 @ 10:10), Max: 37.1 (08-02-21 @ 09:38)  HR: 80 (08-02-21 @ 10:10) (64 - 88)  BP: 134/66 (08-02-21 @ 10:10) (104/63 - 136/85)  RR: 18 (08-02-21 @ 10:10) (16 - 19)  SpO2: 99% (08-02-21 @ 10:10) (93% - 100%)  Wt(kg): --  I&O's Summary      Appearance: Normal	  HEENT:   Normal oral mucosa, EOMI	  Cardiovascular:  S1 S2, No JVD,    Respiratory: Lungs clear to auscultation	  Psychiatry: Alert  Gastrointestinal:  Soft, Non-tender, + BS	  Skin: No rashes   Neurologic: Non-focal  Extremities:  No edema  Vascular: Peripheral pulses palpable    	  	  CARDIAC MARKERS:  Labs personally reviewed by me                        14.7   7.05  )-----------( 367      ( 02 Aug 2021 06:50 )             45.3     08-02    141  |  104  |  20  ----------------------------<  91  3.9   |  26  |  1.00    Ca    9.8      02 Aug 2021 06:50  Phos  3.6     08-01  Mg     2.2     08-01    TPro  7.7  /  Alb  4.3  /  TBili  0.3  /  DBili  x   /  AST  20  /  ALT  24  /  AlkPhos  96  08-02  EKG: Personally reviewed by me -   Radiology: Personally reviewed by me -   < from: CT Angio Chest PE Protocol w/ IV Cont (08.01.21 @ 17:00) >  IMPRESSION: Pulmonary emboli within the subsegmental branches of the posterior segment of the right lower lobe pulmonary artery.    The above finding was communicated to Dr Richmond on 8/1/2021 at 5:29 PM.      < end of copied text >  < from: Xray Chest 2 Views PA/Lat (08.01.21 @ 16:53) >  IMPRESSION:  Clear lungs. Correlate with CTscan that was performed the same date.      < end of copied text >  Assessment /Plan:    31 M hx diagnosed provoked DVT (travelling, spent 24hrs stationary in airport otw to [place]) p/w SOB and mild chest discomfort since last night. Chest discomfort subcostal "need to take a deep breath". No diaphoresis/ N/V. No radiation of chest discomfort. No change with exertion.    Problem/Plan - 1:  ·  Problem: Pulmonary embolus.  Plan: CT angio + PE   -pending TTE to assess for RV dysfunction   - has been on Xarelto since last week for DVT   - f/u heme consult   ac with full dose lovenox to coumadin bridge  - likely hypercoag state    Problem/Plan - 2:  ·  Problem: Deep vein thrombosis. provoked by long flight  has been on xarelto   on full dose lovenox ac   - f/u heme onc reccs  pending echo     Problem/Plan - 3:  ·  Problem: GI ppx     Advanced care planning/advanced directives discussed with patient/family. DNR status including forceful chest compressions to attempt to restart the heart, ventilator support/artificial breathing, electric shock, artificial nutrition, health care proxy, Molst form all discussed with pt. Pt wishes to consider.  More than fifteen minutes spent on discussing advanced directives. OMT on six regions for acute somatic dysfunctions done at the bedside       Misa Serrano ANP-C  Giovanni Agosto DO [facility]  Cardiovascular Medicine  [address] [facility] [facility], [address]  Office [phone]  Cell [phone]

## 2021-08-02 NOTE — PROGRESS NOTE ADULT - SUBJECTIVE AND OBJECTIVE BOX
Patient is a 31y old  Male who presents with a chief complaint of SOB , pleuritic chest pain (02 Aug 2021 13:49)      INTERVAL HPI/OVERNIGHT EVENTS: denies any SOB   T(C): 36.4 (08-03-21 @ 01:22), Max: 37.1 (08-02-21 @ 09:38)  HR: 61 (08-03-21 @ 01:22) (61 - 82)  BP: 110/73 (08-03-21 @ 01:22) (104/63 - 134/66)  RR: 18 (08-03-21 @ 01:22) (18 - 18)  SpO2: 97% (08-03-21 @ 01:22) (97% - 100%)  Wt(kg): --  I&O's Summary    02 Aug 2021 07:01  -  03 Aug 2021 02:44  --------------------------------------------------------  IN: 760 mL / OUT: 1350 mL / NET: -590 mL        PAST MEDICAL & SURGICAL HISTORY:  COVID-19 vaccine series completed  6/2/21    DVT (deep venous thrombosis)    No significant past surgical history        SOCIAL HISTORY  Alcohol:  Tobacco:  Illicit substance use:    FAMILY HISTORY:    REVIEW OF SYSTEMS:  CONSTITUTIONAL: No fever, weight loss, or fatigue  EYES: No eye pain, visual disturbances, or discharge  ENMT:  No difficulty hearing, tinnitus, vertigo; No sinus or throat pain  NECK: No pain or stiffness  RESPIRATORY: No cough, wheezing, chills or hemoptysis; No shortness of breath  CARDIOVASCULAR: No chest pain, palpitations, dizziness, or leg swelling  GASTROINTESTINAL: No abdominal or epigastric pain. No nausea, vomiting, or hematemesis; No diarrhea or constipation. No melena or hematochezia.  GENITOURINARY: No dysuria, frequency, hematuria, or incontinence  NEUROLOGICAL: No headaches, memory loss, loss of strength, numbness, or tremors  SKIN: No itching, burning, rashes, or lesions   LYMPH NODES: No enlarged glands  ENDOCRINE: No heat or cold intolerance; No hair loss  MUSCULOSKELETAL: No joint pain or swelling; No muscle, back, or extremity pain  PSYCHIATRIC: No depression, anxiety, mood swings, or difficulty sleeping  HEME/LYMPH: No easy bruising, or bleeding gums  ALLERY AND IMMUNOLOGIC: No hives or eczema    RADIOLOGY & ADDITIONAL TESTS:    Imaging Personally Reviewed:  [ ] YES  [ ] NO    Consultant(s) Notes Reviewed:  [ ] YES  [ ] NO    PHYSICAL EXAM:  GENERAL: NAD, well-groomed, well-developed  HEAD:  Atraumatic, Normocephalic  EYES: EOMI, PERRLA, conjunctiva and sclera clear  ENMT: No tonsillar erythema, exudates, or enlargement; Moist mucous membranes, Good dentition, No lesions  NECK: Supple, No JVD, Normal thyroid  NERVOUS SYSTEM:  Alert & Oriented X3, Good concentration; Motor Strength 5/5 B/L upper and lower extremities; DTRs 2+ intact and symmetric  CHEST/LUNG: Clear to percussion bilaterally; No rales, rhonchi, wheezing, or rubs  HEART: Regular rate and rhythm; No murmurs, rubs, or gallops  ABDOMEN: Soft, Nontender, Nondistended; Bowel sounds present  EXTREMITIES:  2+ Peripheral Pulses, No clubbing, cyanosis, or edema  LYMPH: No lymphadenopathy noted  SKIN: No rashes or lesions    LABS:                        14.7   7.05  )-----------( 367      ( 02 Aug 2021 06:50 )             45.3     08-02    141  |  104  |  20  ----------------------------<  91  3.9   |  26  |  1.00    Ca    9.8      02 Aug 2021 06:50  Phos  3.6     08-01  Mg     2.2     08-01    TPro  7.7  /  Alb  4.3  /  TBili  0.3  /  DBili  x   /  AST  20  /  ALT  24  /  AlkPhos  96  08-02    PT/INR - ( 01 Aug 2021 18:06 )   PT: 15.6 sec;   INR: 1.32 ratio         PTT - ( 01 Aug 2021 18:06 )  PTT:47.2 sec    CAPILLARY BLOOD GLUCOSE                MEDICATIONS  (STANDING):  enoxaparin Injectable 70 milliGRAM(s) SubCutaneous every 12 hours  pantoprazole    Tablet 40 milliGRAM(s) Oral before breakfast    MEDICATIONS  (PRN):  benzocaine 15 mG/menthol 3.6 mG (Sugar-Free) Lozenge 1 Lozenge Oral every 8 hours PRN Sore Throat      Care Discussed with Consultants/Other Providers [ ] YES  [ ] NO

## 2021-08-02 NOTE — PATIENT PROFILE ADULT - DOMESTIC TRAVEL HIGH RISK ALERT 1
Urine culture result back, positive for E. coli, susceptible to cephalosporins in urine, patient discharged home on Keflex.  Patient called, states feels better, no fever, no back pain, taking her antibiotic as prescribed.  Recommended to increase fluid intake, close follow-up with primary care physician at the end of the treatment to reevaluate. [place]

## 2021-08-03 LAB
AT III ACT/NOR PPP CHRO: 87 % — SIGNIFICANT CHANGE UP (ref 85–135)
DRVVT SCREEN TO CONFIRM RATIO: SIGNIFICANT CHANGE UP
HCT VFR BLD CALC: 48 % — SIGNIFICANT CHANGE UP (ref 39–50)
HGB BLD-MCNC: 15.7 G/DL — SIGNIFICANT CHANGE UP (ref 13–17)
INR BLD: 1 RATIO — SIGNIFICANT CHANGE UP (ref 0.88–1.16)
LA NT DPL PPP QL: 41.8 SEC — SIGNIFICANT CHANGE UP
MCHC RBC-ENTMCNC: 29.9 PG — SIGNIFICANT CHANGE UP (ref 27–34)
MCHC RBC-ENTMCNC: 32.7 GM/DL — SIGNIFICANT CHANGE UP (ref 32–36)
MCV RBC AUTO: 91.4 FL — SIGNIFICANT CHANGE UP (ref 80–100)
NORMALIZED SCT PPP-RTO: 0.81 RATIO — SIGNIFICANT CHANGE UP (ref 0–1.16)
NORMALIZED SCT PPP-RTO: SIGNIFICANT CHANGE UP
NRBC # BLD: 0 /100 WBCS — SIGNIFICANT CHANGE UP (ref 0–0)
PLATELET # BLD AUTO: 404 K/UL — HIGH (ref 150–400)
PROT C ACT/NOR PPP: 130 % — SIGNIFICANT CHANGE UP (ref 74–150)
PROTHROM AB SERPL-ACNC: 12 SEC — SIGNIFICANT CHANGE UP (ref 10.6–13.6)
RBC # BLD: 5.25 M/UL — SIGNIFICANT CHANGE UP (ref 4.2–5.8)
RBC # FLD: 11.9 % — SIGNIFICANT CHANGE UP (ref 10.3–14.5)
WBC # BLD: 6.49 K/UL — SIGNIFICANT CHANGE UP (ref 3.8–10.5)
WBC # FLD AUTO: 6.49 K/UL — SIGNIFICANT CHANGE UP (ref 3.8–10.5)

## 2021-08-03 PROCEDURE — G0452: CPT | Mod: 26

## 2021-08-03 PROCEDURE — 93306 TTE W/DOPPLER COMPLETE: CPT | Mod: 26

## 2021-08-03 RX ORDER — WARFARIN SODIUM 2.5 MG/1
7.5 TABLET ORAL ONCE
Refills: 0 | Status: COMPLETED | OUTPATIENT
Start: 2021-08-03 | End: 2021-08-03

## 2021-08-03 RX ORDER — ACETAMINOPHEN 500 MG
1000 TABLET ORAL ONCE
Refills: 0 | Status: COMPLETED | OUTPATIENT
Start: 2021-08-03 | End: 2021-08-03

## 2021-08-03 RX ADMIN — Medication 1000 MILLIGRAM(S): at 16:23

## 2021-08-03 RX ADMIN — WARFARIN SODIUM 7.5 MILLIGRAM(S): 2.5 TABLET ORAL at 22:01

## 2021-08-03 RX ADMIN — ENOXAPARIN SODIUM 70 MILLIGRAM(S): 100 INJECTION SUBCUTANEOUS at 22:00

## 2021-08-03 RX ADMIN — ENOXAPARIN SODIUM 70 MILLIGRAM(S): 100 INJECTION SUBCUTANEOUS at 10:39

## 2021-08-03 RX ADMIN — Medication 1000 MILLIGRAM(S): at 15:53

## 2021-08-03 NOTE — PROVIDER CONTACT NOTE (OTHER) - ACTION/TREATMENT ORDERED:
will discontinue cardiac bedside monitor and bedrest order, continue to monitor
PA aware. will come see patient. will continue to monitor.
PA to order medications, continue to monitor.

## 2021-08-03 NOTE — CHART NOTE - NSCHARTNOTEFT_GEN_A_CORE
MEDICINE PA     CHIEF COMPLAINT  Patient is a 31y old  Male who presents with a chief complaint of SOB , pleuritic chest pain (03 Aug 2021 18:48)      EVENT SUMMARY   Notified by RN for tingling. Pt seen and examined at bedside. Pt is alert. Pt states that the tip of his L. 4th and 5th fingers are tingling which started abruptly. Pt states that this has happened to him 2 weeks ago while he was working out and resolved on its own. Pt was c/o nausea earlier but attributes it to the salmon he had for the dinner and felt better after he vomited. Pt otherwise denies CP, back pain, numbness, tingling elsewhere, headache, diplopia, blurry vision, weakness.     REVIEW OF SYSTEMS:  CONSTITUTIONAL: No weakness, fevers or chills  EYES/ENT: No visual changes;  No vertigo or throat pain   NECK: No pain or stiffness  RESPIRATORY: No cough, wheezing, hemoptysis; No shortness of breath  CARDIOVASCULAR: No chest pain or palpitations  GASTROINTESTINAL: No abdominal or epigastric pain. No nausea, vomiting, or hematemesis; No diarrhea or constipation. No melena or hematochezia.  GENITOURINARY: No dysuria, frequency or hematuria  NEUROLOGICAL: No numbness or weakness  SKIN: No itching, rashes    MEDICATIONS  (STANDING):  enoxaparin Injectable 70 milliGRAM(s) SubCutaneous every 12 hours  pantoprazole    Tablet 40 milliGRAM(s) Oral before breakfast    MEDICATIONS  (PRN):  benzocaine 15 mG/menthol 3.6 mG (Sugar-Free) Lozenge 1 Lozenge Oral every 8 hours PRN Sore Throat    Allergies    No Known Allergies    Intolerances    Vital Signs Last 24 Hrs  T(C): 36.5 (03 Aug 2021 21:41), Max: 36.6 (03 Aug 2021 09:12)  T(F): 97.7 (03 Aug 2021 21:41), Max: 97.9 (03 Aug 2021 09:12)  HR: 70 (03 Aug 2021 21:41) (60 - 75)  BP: 117/70 (03 Aug 2021 21:41) (110/73 - 136/82)  BP(mean): --  RR: 18 (03 Aug 2021 21:41) (18 - 18)  SpO2: 96% (03 Aug 2021 21:41) (96% - 99%)    PHYSICAL EXAM:  Constitutional: NAD resting comfortably in bed  Eyes: PERRLA   ENMT: no tongue deviation, uvula midline   Respiratory: CTA b/l   Cardiovascular: S1, S2 RRR  Gastrointestinal: soft NT ND + BS   Vascular: no edema   Neurological: AOx 3 no focal deficits  Musculoskeletal: 5/5 strength of all four extremities                      15.7   6.49  )-----------( 404      ( 03 Aug 2021 07:37 )             48.0       08-02    141  |  104  |  20  ----------------------------<  91  3.9   |  26  |  1.00    Ca    9.8      02 Aug 2021 06:50    TPro  7.7  /  Alb  4.3  /  TBili  0.3  /  DBili  x   /  AST  20  /  ALT  24  /  AlkPhos  96  08-02    A&P  31 M hx diagnosed provoked DVT (travelling, spent 24hrs stationary in airport otw to [place]) p/w SOB and mild chest discomfort since last night. Chest discomfort subcostal "need to take a deep breath". No diaphoresis/ N/V. No radiation of chest discomfort. No change with exertion.    Paresthesias; acute onset   - CTH urgent given pt on lovenox - coumadin bridge   - Neurochecks q 4  - will c/w current management at this time given  RN made aware of the plan of care  Will f/u with attending.     Jazmyne WATTS   Department of Medicine   #[id number] MEDICINE PA     CHIEF COMPLAINT  Patient is a 31y old  Male who presents with a chief complaint of SOB , pleuritic chest pain (03 Aug 2021 18:48)      EVENT SUMMARY   Notified by RN for tingling. Pt seen and examined at bedside. Pt is alert. Pt states that the tip of his L. 4th and 5th fingers are tingling which started abruptly. Pt states that this has happened to him 2 weeks ago while he was working out and resolved on its own. Pt was c/o nausea earlier but attributes it to the salmon he had for the dinner and felt better after he vomited. Pt otherwise denies CP, back pain, numbness, tingling elsewhere, headache, diplopia, blurry vision, weakness.     REVIEW OF SYSTEMS:  CONSTITUTIONAL: No weakness, fevers or chills  EYES/ENT: No visual changes;  No vertigo or throat pain   NECK: No pain or stiffness  RESPIRATORY: No cough, wheezing, hemoptysis; No shortness of breath  CARDIOVASCULAR: No chest pain or palpitations  GASTROINTESTINAL: No abdominal or epigastric pain. No nausea, vomiting, or hematemesis; No diarrhea or constipation. No melena or hematochezia.  GENITOURINARY: No dysuria, frequency or hematuria  NEUROLOGICAL: No numbness or weakness  SKIN: No itching, rashes    MEDICATIONS  (STANDING):  enoxaparin Injectable 70 milliGRAM(s) SubCutaneous every 12 hours  pantoprazole    Tablet 40 milliGRAM(s) Oral before breakfast    MEDICATIONS  (PRN):  benzocaine 15 mG/menthol 3.6 mG (Sugar-Free) Lozenge 1 Lozenge Oral every 8 hours PRN Sore Throat    Allergies    No Known Allergies    Intolerances    Vital Signs Last 24 Hrs  T(C): 36.5 (03 Aug 2021 21:41), Max: 36.6 (03 Aug 2021 09:12)  T(F): 97.7 (03 Aug 2021 21:41), Max: 97.9 (03 Aug 2021 09:12)  HR: 70 (03 Aug 2021 21:41) (60 - 75)  BP: 117/70 (03 Aug 2021 21:41) (110/73 - 136/82)  BP(mean): --  RR: 18 (03 Aug 2021 21:41) (18 - 18)  SpO2: 96% (03 Aug 2021 21:41) (96% - 99%)    PHYSICAL EXAM:  Constitutional: NAD resting comfortably in bed  Eyes: PERRLA   ENMT: no tongue deviation, uvula midline   Respiratory: CTA b/l   Cardiovascular: S1, S2 RRR  Gastrointestinal: soft NT ND + BS   Vascular: no edema   Neurological: AOx 3 no focal deficits  Musculoskeletal: 5/5 strength of all four extremities                      15.7   6.49  )-----------( 404      ( 03 Aug 2021 07:37 )             48.0       08-02    141  |  104  |  20  ----------------------------<  91  3.9   |  26  |  1.00    Ca    9.8      02 Aug 2021 06:50    TPro  7.7  /  Alb  4.3  /  TBili  0.3  /  DBili  x   /  AST  20  /  ALT  24  /  AlkPhos  96  08-02    A&P  31 M hx diagnosed provoked DVT (travelling, spent 24hrs stationary in airport otw to [place]) p/w SOB and mild chest discomfort since last night. Chest discomfort subcostal "need to take a deep breath". No diaphoresis/ N/V. No radiation of chest discomfort. No change with exertion.    Paresthesias; acute onset   - CTH urgent given pt on lovenox - coumadin bridge   - Neurochecks q 4  - will c/w current management at this time given  RN made aware of the plan of care  Will f/u with attending.     Jazmyne WATTS   Department of Medicine   #[id number]    ADDENDUM @ 1:00AM  CT head done with below results   INTERPRETATION:  No acute intracranial hemorrhage.  Follow-up offical read in AM.  Findings discussed with MERARY Ding.    Pt states that tingling has resolved on its own. Will continue with neurochecks q 4. Will continue to monitor. RN made aware.     Jazmyne WATTS  #[id number]

## 2021-08-03 NOTE — PROVIDER CONTACT NOTE (OTHER) - ASSESSMENT
VSS. no signs of bleeding. pt was not laying on his hand. no complaints of chest pain, SOB VSS. no signs of bleeding. pt was not laying on his hand. no complaints of chest pain, SOB. no complaints of numbness. no tingling anywhere else. pt has strong , pupils equal

## 2021-08-03 NOTE — PROVIDER CONTACT NOTE (OTHER) - SITUATION
pt c/o throat discomfort requesting medication for relief
pt received from ED, reviewing orders has active cardiac bedside monitor order and 2 different activity orders
pt complaining of tingling in L hand. pinky finger and 4th finger. pt states he was playing on his phone when tingling started

## 2021-08-03 NOTE — PROGRESS NOTE ADULT - SUBJECTIVE AND OBJECTIVE BOX
Patient is a 31y old  Male who presents with a chief complaint of SOB , pleuritic chest pain (03 Aug 2021 18:48)      INTERVAL HPI/OVERNIGHT EVENTS: no c/o  T(C): 36.3 (08-04-21 @ 01:46), Max: 36.6 (08-03-21 @ 09:12)  HR: 54 (08-04-21 @ 01:46) (54 - 75)  BP: 111/70 (08-04-21 @ 01:46) (111/70 - 136/82)  RR: 18 (08-04-21 @ 01:46) (18 - 18)  SpO2: 97% (08-04-21 @ 01:46) (96% - 99%)  Wt(kg): --  I&O's Summary    02 Aug 2021 07:01  -  03 Aug 2021 07:00  --------------------------------------------------------  IN: 760 mL / OUT: 1850 mL / NET: -1090 mL    03 Aug 2021 07:01  -  04 Aug 2021 01:55  --------------------------------------------------------  IN: 1040 mL / OUT: 1500 mL / NET: -460 mL        PAST MEDICAL & SURGICAL HISTORY:  COVID-19 vaccine series completed  6/2/21    DVT (deep venous thrombosis)    No significant past surgical history        SOCIAL HISTORY  Alcohol:  Tobacco:  Illicit substance use:    FAMILY HISTORY:    REVIEW OF SYSTEMS:  CONSTITUTIONAL: No fever, weight loss, or fatigue  EYES: No eye pain, visual disturbances, or discharge  ENMT:  No difficulty hearing, tinnitus, vertigo; No sinus or throat pain  NECK: No pain or stiffness  RESPIRATORY: No cough, wheezing, chills or hemoptysis; No shortness of breath  CARDIOVASCULAR: No chest pain, palpitations, dizziness, or leg swelling  GASTROINTESTINAL: No abdominal or epigastric pain. No nausea, vomiting, or hematemesis; No diarrhea or constipation. No melena or hematochezia.  GENITOURINARY: No dysuria, frequency, hematuria, or incontinence  NEUROLOGICAL: No headaches, memory loss, loss of strength, numbness, or tremors  SKIN: No itching, burning, rashes, or lesions   LYMPH NODES: No enlarged glands  ENDOCRINE: No heat or cold intolerance; No hair loss  MUSCULOSKELETAL: No joint pain or swelling; No muscle, back, or extremity pain  PSYCHIATRIC: No depression, anxiety, mood swings, or difficulty sleeping  HEME/LYMPH: No easy bruising, or bleeding gums  ALLERY AND IMMUNOLOGIC: No hives or eczema    RADIOLOGY & ADDITIONAL TESTS:    Imaging Personally Reviewed:  [ ] YES  [ ] NO    Consultant(s) Notes Reviewed:  [ ] YES  [ ] NO    PHYSICAL EXAM:  GENERAL: NAD, well-groomed, well-developed  HEAD:  Atraumatic, Normocephalic  EYES: EOMI, PERRLA, conjunctiva and sclera clear  ENMT: No tonsillar erythema, exudates, or enlargement; Moist mucous membranes, Good dentition, No lesions  NECK: Supple, No JVD, Normal thyroid  NERVOUS SYSTEM:  Alert & Oriented X3, Good concentration; Motor Strength 5/5 B/L upper and lower extremities; DTRs 2+ intact and symmetric  CHEST/LUNG: Clear to percussion bilaterally; No rales, rhonchi, wheezing, or rubs  HEART: Regular rate and rhythm; No murmurs, rubs, or gallops  ABDOMEN: Soft, Nontender, Nondistended; Bowel sounds present  EXTREMITIES:  2+ Peripheral Pulses, No clubbing, cyanosis, or edema  LYMPH: No lymphadenopathy noted  SKIN: No rashes or lesions    LABS:                        15.7   6.49  )-----------( 404      ( 03 Aug 2021 07:37 )             48.0     08-02    141  |  104  |  20  ----------------------------<  91  3.9   |  26  |  1.00    Ca    9.8      02 Aug 2021 06:50    TPro  7.7  /  Alb  4.3  /  TBili  0.3  /  DBili  x   /  AST  20  /  ALT  24  /  AlkPhos  96  08-02    PT/INR - ( 03 Aug 2021 07:38 )   PT: 12.0 sec;   INR: 1.00 ratio             CAPILLARY BLOOD GLUCOSE                MEDICATIONS  (STANDING):  enoxaparin Injectable 70 milliGRAM(s) SubCutaneous every 12 hours  pantoprazole    Tablet 40 milliGRAM(s) Oral before breakfast    MEDICATIONS  (PRN):  benzocaine 15 mG/menthol 3.6 mG (Sugar-Free) Lozenge 1 Lozenge Oral every 8 hours PRN Sore Throat      Care Discussed with Consultants/Other Providers [ ] YES  [ ] NO

## 2021-08-03 NOTE — PROGRESS NOTE ADULT - SUBJECTIVE AND OBJECTIVE BOX
DATE OF SERVICE: 08-03-21 @ 22:48    Patient is a 31y old  Male who presents with a chief complaint of SOB , pleuritic chest pain (02 Aug 2021 19:44)      INTERVAL HISTORY: feels ok, no events    REVIEW OF SYSTEMS:  CONSTITUTIONAL: No weakness  EYES/ENT: No visual changes;  No throat pain   NECK: No pain or stiffness  RESPIRATORY: No cough, wheezing; No shortness of breath  CARDIOVASCULAR: No chest pain or palpitations  GASTROINTESTINAL: No abdominal  pain. No nausea, vomiting, or hematemesis  GENITOURINARY: No dysuria, frequency or hematuria  NEUROLOGICAL: No stroke like symptoms  SKIN: No rashes        PHYSICAL EXAM:  T(C): 36.5 (08-03-21 @ 21:41), Max: 36.6 (08-03-21 @ 09:12)  HR: 70 (08-03-21 @ 21:41) (60 - 75)  BP: 117/70 (08-03-21 @ 21:41) (110/73 - 136/82)  RR: 18 (08-03-21 @ 21:41) (18 - 18)  SpO2: 96% (08-03-21 @ 21:41) (96% - 99%)  Wt(kg): --  I&O's Summary    02 Aug 2021 07:01  -  03 Aug 2021 07:00  --------------------------------------------------------  IN: 760 mL / OUT: 1850 mL / NET: -1090 mL    03 Aug 2021 07:01  -  03 Aug 2021 22:48  --------------------------------------------------------  IN: 1040 mL / OUT: 1225 mL / NET: -185 mL          Appearance: In no distress	  HEENT:    PERRL, EOMI	  Cardiovascular:  S1 S2, No JVD  Respiratory: Lungs clear to auscultation	  Gastrointestinal:  Soft, Non-tender, + BS	  Vascularature:  No edema of LE  Psychiatric: Appropriate affect   Neuro: no acute focal deficits                               15.7   6.49  )-----------( 404      ( 03 Aug 2021 07:37 )             48.0     08-02    141  |  104  |  20  ----------------------------<  91  3.9   |  26  |  1.00    Ca    9.8      02 Aug 2021 06:50    TPro  7.7  /  Alb  4.3  /  TBili  0.3  /  DBili  x   /  AST  20  /  ALT  24  /  AlkPhos  96  08-02        Labs personally reviewed      end of copied text >  < from: Xray Chest 2 Views PA/Lat (08.01.21 @ 16:53) >  IMPRESSION:  Clear lungs. Correlate with CTscan that was performed the same date.      < end of copied text >  Assessment /Plan:    31 M hx diagnosed provoked DVT (travelling, spent 24hrs stationary in airport otw to [place]) p/w SOB and mild chest discomfort since last night. Chest discomfort subcostal "need to take a deep breath". No diaphoresis/ N/V. No radiation of chest discomfort. No change with exertion.    Problem/Plan - 1:  ·  Problem: Pulmonary embolus.  Plan: CT angio + PE   - TTE normal   - has been on Xarelto since last week for DVT   - f/u heme consult   ac with full dose lovenox to coumadin bridge  - likely hypercoag state    Problem/Plan - 2:  ·  Problem: Deep vein thrombosis. provoked by long flight  has been on xarelto   on full dose lovenox ac   - f/u heme onc reccs  pending echo     Problem/Plan - 3:  ·  Problem: GI ppx       I had a prolonged conversation with the patient regarding hospital course, differential diagnosis and results of diagnostic tests.  Plan of care discussed with patient after the evaluation. Patient expresses clear understanding and satisfaction with the plan of care. Sixty five minutes spent on encounter, of which more than fifty percent of the encounter was spent on counseling and/or coordinating care by the attending physician.      Giovanni Agosto DO [facility]  Cardiovascular Medicine  [address] [facility], [address]  Office: [phone]  Cell: [phone]

## 2021-08-04 LAB
B2 GLYCOPROT1 AB SER QL: NEGATIVE — SIGNIFICANT CHANGE UP
CARDIOLIPIN AB SER-ACNC: POSITIVE
INR BLD: 1.07 RATIO — SIGNIFICANT CHANGE UP (ref 0.88–1.16)
PROTHROM AB SERPL-ACNC: 12.8 SEC — SIGNIFICANT CHANGE UP (ref 10.6–13.6)

## 2021-08-04 PROCEDURE — 70450 CT HEAD/BRAIN W/O DYE: CPT | Mod: 26

## 2021-08-04 RX ORDER — WARFARIN SODIUM 2.5 MG/1
7.5 TABLET ORAL ONCE
Refills: 0 | Status: COMPLETED | OUTPATIENT
Start: 2021-08-04 | End: 2021-08-04

## 2021-08-04 RX ADMIN — ENOXAPARIN SODIUM 70 MILLIGRAM(S): 100 INJECTION SUBCUTANEOUS at 12:08

## 2021-08-04 RX ADMIN — WARFARIN SODIUM 7.5 MILLIGRAM(S): 2.5 TABLET ORAL at 22:09

## 2021-08-04 RX ADMIN — PANTOPRAZOLE SODIUM 40 MILLIGRAM(S): 20 TABLET, DELAYED RELEASE ORAL at 05:18

## 2021-08-04 RX ADMIN — ENOXAPARIN SODIUM 70 MILLIGRAM(S): 100 INJECTION SUBCUTANEOUS at 22:10

## 2021-08-04 NOTE — PROGRESS NOTE ADULT - SUBJECTIVE AND OBJECTIVE BOX
Patient is a 31y old  Male who presents with a chief complaint of SOB , pleuritic chest pain (04 Aug 2021 13:42)      INTERVAL HPI/OVERNIGHT EVENTS: no c/o INR 1.3  T(C): 36.4 (08-04-21 @ 21:35), Max: 37.1 (08-04-21 @ 09:31)  HR: 62 (08-04-21 @ 21:35) (54 - 76)  BP: 114/73 (08-04-21 @ 21:35) (103/66 - 120/70)  RR: 18 (08-04-21 @ 21:35) (18 - 18)  SpO2: 98% (08-04-21 @ 21:35) (96% - 99%)  Wt(kg): --  I&O's Summary    03 Aug 2021 07:01  -  04 Aug 2021 07:00  --------------------------------------------------------  IN: 1040 mL / OUT: 1500 mL / NET: -460 mL    04 Aug 2021 07:01  -  04 Aug 2021 23:50  --------------------------------------------------------  IN: 1260 mL / OUT: 800 mL / NET: 460 mL        PAST MEDICAL & SURGICAL HISTORY:  COVID-19 vaccine series completed  6/2/21    DVT (deep venous thrombosis)    No significant past surgical history        SOCIAL HISTORY  Alcohol:  Tobacco:  Illicit substance use:    FAMILY HISTORY:    REVIEW OF SYSTEMS:  CONSTITUTIONAL: No fever, weight loss, or fatigue  EYES: No eye pain, visual disturbances, or discharge  ENMT:  No difficulty hearing, tinnitus, vertigo; No sinus or throat pain  NECK: No pain or stiffness  RESPIRATORY: No cough, wheezing, chills or hemoptysis; No shortness of breath  CARDIOVASCULAR: No chest pain, palpitations, dizziness, or leg swelling  GASTROINTESTINAL: No abdominal or epigastric pain. No nausea, vomiting, or hematemesis; No diarrhea or constipation. No melena or hematochezia.  GENITOURINARY: No dysuria, frequency, hematuria, or incontinence  NEUROLOGICAL: No headaches, memory loss, loss of strength, numbness, or tremors  SKIN: No itching, burning, rashes, or lesions   LYMPH NODES: No enlarged glands  ENDOCRINE: No heat or cold intolerance; No hair loss  MUSCULOSKELETAL: No joint pain or swelling; No muscle, back, or extremity pain  PSYCHIATRIC: No depression, anxiety, mood swings, or difficulty sleeping  HEME/LYMPH: No easy bruising, or bleeding gums  ALLERY AND IMMUNOLOGIC: No hives or eczema    RADIOLOGY & ADDITIONAL TESTS:    Imaging Personally Reviewed:  [ ] YES  [ ] NO    Consultant(s) Notes Reviewed:  [ ] YES  [ ] NO    PHYSICAL EXAM:  GENERAL: NAD, well-groomed, well-developed  HEAD:  Atraumatic, Normocephalic  EYES: EOMI, PERRLA, conjunctiva and sclera clear  ENMT: No tonsillar erythema, exudates, or enlargement; Moist mucous membranes, Good dentition, No lesions  NECK: Supple, No JVD, Normal thyroid  NERVOUS SYSTEM:  Alert & Oriented X3, Good concentration; Motor Strength 5/5 B/L upper and lower extremities; DTRs 2+ intact and symmetric  CHEST/LUNG: Clear to percussion bilaterally; No rales, rhonchi, wheezing, or rubs  HEART: Regular rate and rhythm; No murmurs, rubs, or gallops  ABDOMEN: Soft, Nontender, Nondistended; Bowel sounds present  EXTREMITIES:  2+ Peripheral Pulses, No clubbing, cyanosis, or edema  LYMPH: No lymphadenopathy noted  SKIN: No rashes or lesions    LABS:                        15.7   6.49  )-----------( 404      ( 03 Aug 2021 07:37 )             48.0           PT/INR - ( 04 Aug 2021 07:23 )   PT: 12.8 sec;   INR: 1.07 ratio             CAPILLARY BLOOD GLUCOSE                MEDICATIONS  (STANDING):  enoxaparin Injectable 70 milliGRAM(s) SubCutaneous every 12 hours  pantoprazole    Tablet 40 milliGRAM(s) Oral before breakfast    MEDICATIONS  (PRN):  benzocaine 15 mG/menthol 3.6 mG (Sugar-Free) Lozenge 1 Lozenge Oral every 8 hours PRN Sore Throat      Care Discussed with Consultants/Other Providers [ ] YES  [ ] NO

## 2021-08-04 NOTE — PROGRESS NOTE ADULT - SUBJECTIVE AND OBJECTIVE BOX
Patient is a 31y old  Male who presents with a chief complaint of SOB , pleuritic chest pain (03 Aug 2021 19:55)      INTERVAL HISTORY: pt is feeling well       REVIEW OF SYSTEMS:   CONSTITUTIONAL: No weakness  EYES/ENT: No visual changes; No throat pain  Neck: No pain or stiffness  Respiratory: No cough, wheezing, No shortness of breath  CARDIOVASCULAR: no chest pain or palpitations  GASTROINTESTINAL: No abdominal pain, no nausea, vomiting or hematemesis  GENITOURINARY: No dysuria, frequency or hematuria  NEUROLOGICAL: No stroke like symptoms  SKIN: No rashes    	  MEDICATIONS:        PHYSICAL EXAM:  T(C): 36.7 (08-04-21 @ 13:20), Max: 37.1 (08-04-21 @ 09:31)  HR: 69 (08-04-21 @ 13:20) (54 - 76)  BP: 120/70 (08-04-21 @ 13:20) (103/66 - 129/78)  RR: 18 (08-04-21 @ 13:20) (18 - 18)  SpO2: 97% (08-04-21 @ 13:20) (96% - 99%)  Wt(kg): --  I&O's Summary    03 Aug 2021 07:01  -  04 Aug 2021 07:00  --------------------------------------------------------  IN: 1040 mL / OUT: 1500 mL / NET: -460 mL    04 Aug 2021 07:01  -  04 Aug 2021 13:43  --------------------------------------------------------  IN: 240 mL / OUT: 350 mL / NET: -110 mL          Appearance: In no distress	  HEENT:    PERRL, EOMI	  Cardiovascular:  S1 S2, No JVD  Respiratory: Lungs clear to auscultation	  Gastrointestinal:  Soft, Non-tender, + BS	  Vascularature:  No edema of LE  Psychiatric: Appropriate affect   Neuro: no acute focal deficits                               15.7   6.49  )-----------( 404      ( 03 Aug 2021 07:37 )             48.0               Labs personally reviewed  radiology   < from: Transthoracic Echocardiogram (08.03.21 @ 10:50) >  Conclusions:  Normal echocardiogram.  ------------------------------------------------------------------------        ASSESSMENT/PLAN: 	    31 M hx diagnosed provoked DVT (travelling, spent 24hrs stationary in airport otw to [place]) p/w SOB and mild chest discomfort since last night. Chest discomfort subcostal "need to take a deep breath". No diaphoresis/ N/V. No radiation of chest discomfort. No change with exertion.    Problem/Plan - 1:  ·  Problem: Pulmonary embolus.  Plan: CT angio + PE   - TTE normal   - has been on Xarelto since last week for DVT   - f/u heme consult   -ac with full dose lovenox to coumadin bridge  - likely hypercoag state  - Warfarin to maintain INR 2.5-3 per heme  -monitor INR. today's 1.07    Problem/Plan - 2:  ·  Problem: Deep vein thrombosis. provoked by long flight  has been on xarelto   on full dose lovenox ac   - ECHO neg for PFO   - Warfarin to maintain INR 2.5-3 per heme    Problem/Plan - 3:  ·  Problem: GI ppx pantoprazole             Niya Carmona [facility]   Giovanni Agosto DO [facility]  Cardiovascular Medicine  [address] [place] [address], [address]  Office: [phone]  Cell: [phone]

## 2021-08-05 ENCOUNTER — TRANSCRIPTION ENCOUNTER (OUTPATIENT)
Age: 31
End: 2021-08-05

## 2021-08-05 LAB
CARDIOLIPIN IGM SER-MCNC: 10 MPL — SIGNIFICANT CHANGE UP (ref 0–12.5)
CARDIOLIPIN IGM SER-MCNC: <5 GPL — SIGNIFICANT CHANGE UP (ref 0–12.5)
DNA PLOIDY SPEC FC-IMP: SIGNIFICANT CHANGE UP
INR BLD: 2.12 RATIO — HIGH (ref 0.88–1.16)
PROT S FREE PPP-ACNC: 123 % — SIGNIFICANT CHANGE UP (ref 63–140)
PROTHROM AB SERPL-ACNC: 24.6 SEC — HIGH (ref 10.6–13.6)
PTR INTERPRETATION: SIGNIFICANT CHANGE UP

## 2021-08-05 RX ORDER — WARFARIN SODIUM 2.5 MG/1
5 TABLET ORAL ONCE
Refills: 0 | Status: COMPLETED | OUTPATIENT
Start: 2021-08-05 | End: 2021-08-05

## 2021-08-05 RX ADMIN — WARFARIN SODIUM 5 MILLIGRAM(S): 2.5 TABLET ORAL at 21:37

## 2021-08-05 RX ADMIN — PANTOPRAZOLE SODIUM 40 MILLIGRAM(S): 20 TABLET, DELAYED RELEASE ORAL at 06:00

## 2021-08-05 RX ADMIN — ENOXAPARIN SODIUM 70 MILLIGRAM(S): 100 INJECTION SUBCUTANEOUS at 23:00

## 2021-08-05 RX ADMIN — ENOXAPARIN SODIUM 70 MILLIGRAM(S): 100 INJECTION SUBCUTANEOUS at 12:09

## 2021-08-05 NOTE — PROGRESS NOTE ADULT - SUBJECTIVE AND OBJECTIVE BOX
Patient is a 31y old  Male who presents with a chief complaint of SOB , pleuritic chest pain (04 Aug 2021 18:50)      INTERVAL HISTORY: pt looking forward to going home     REVIEW OF SYSTEMS:   CONSTITUTIONAL: No weakness  EYES/ENT: No visual changes; No throat pain  Neck: No pain or stiffness  Respiratory: No cough, wheezing, No shortness of breath  CARDIOVASCULAR: no chest pain or palpitations  GASTROINTESTINAL: No abdominal pain, no nausea, vomiting or hematemesis  GENITOURINARY: No dysuria, frequency or hematuria  NEUROLOGICAL: No stroke like symptoms  SKIN: No rashes    	  MEDICATIONS:        PHYSICAL EXAM:  T(C): 36.5 (08-05-21 @ 13:14), Max: 36.5 (08-04-21 @ 17:01)  HR: 67 (08-05-21 @ 13:14) (58 - 74)  BP: 112/73 (08-05-21 @ 13:14) (102/66 - 114/73)  RR: 18 (08-05-21 @ 13:14) (18 - 18)  SpO2: 97% (08-05-21 @ 13:14) (97% - 100%)  Wt(kg): --  I&O's Summary    04 Aug 2021 07:01  -  05 Aug 2021 07:00  --------------------------------------------------------  IN: 1740 mL / OUT: 1050 mL / NET: 690 mL    05 Aug 2021 07:01  -  05 Aug 2021 15:37  --------------------------------------------------------  IN: 480 mL / OUT: 800 mL / NET: -320 mL          Appearance: In no distress	  HEENT:    PERRL, EOMI	  Cardiovascular:  S1 S2, No JVD  Respiratory: Lungs clear to auscultation	  Gastrointestinal:  Soft, Non-tender, + BS	  Vascularature:  No edema of LE  Psychiatric: Appropriate affect   Neuro: no acute focal deficits                     Labs personally reviewed      ASSESSMENT/PLAN: 	      31 M hx diagnosed provoked DVT (travelling, spent 24hrs stationary in airport otw to [place]) p/w SOB and mild chest discomfort since last night. Chest discomfort subcostal "need to take a deep breath". No diaphoresis/ N/V. No radiation of chest discomfort. No change with exertion.    Problem/Plan - 1:  ·  Problem: Pulmonary embolus.  Plan: CT angio + PE   - TTE normal   - has been on Xarelto since last week for DVT   - f/u heme consult   -ac with full dose lovenox to coumadin bridge  - likely hypercoag state  - Warfarin to maintain INR 2.5-3 per heme  -monitor INR. today's 2.12    Problem/Plan - 2:  ·  Problem: Deep vein thrombosis. provoked by long flight  has been on xarelto   on full dose lovenox ac   - ECHO neg for PFO   - Warfarin to maintain INR 2.5-3 per heme    Problem/Plan - 3:  ·  Problem: GI ppx pantoprazole             Niya Carmona [facility]   iGovanni Agosto DO [facility]  Cardiovascular Medicine  [address] [facility], [address]  Office: [phone]  Cell: [phone]

## 2021-08-05 NOTE — DISCHARGE NOTE PROVIDER - CARE PROVIDER_API CALL
Ramon Schmidt)  Internal Medicine  [address]  [place] [address]  Phone: [phone]  Fax: [phone]  Follow Up Time:     Dr. Kale Whitt, Primary Care Doctor  [address] [place] [address]  Phone: [phone]  Fax: (   )    -  Follow Up Time:    Ramon Schmdit)  Internal Medicine  [address]  [place] [address]  Phone: [phone]  Fax: [phone]  Follow Up Time:     Giovanni Agosto (DO)  Cardiovascular Disease; Internal Medicine; Nuclear Cardiology  [address], [address]  [place] [address]  Phone: [phone]  Fax: [phone]  Follow Up Time:     Dr. Kale Whitt, Primary Care Doctor  [address] [place] [address]  Phone: [phone]  Fax: (   )    -  Follow Up Time:     Norman Good)  Medicine  [address] [place] [address]  Phone: [phone]  Fax: [phone]  Follow Up Time:

## 2021-08-05 NOTE — DISCHARGE NOTE PROVIDER - CARE PROVIDERS DIRECT ADDRESSES
,DirectAddress_Unknown,DirectAddress_Unknown ,DirectAddress_Unknown,DirectAddress_Unknown,DirectAddress_Unknown,[email or web address]

## 2021-08-05 NOTE — PROGRESS NOTE ADULT - SUBJECTIVE AND OBJECTIVE BOX
Patient is a 31y old  Male who presents with a chief complaint of SOB , pleuritic chest pain (05 Aug 2021 18:10)      INTERVAL HPI/OVERNIGHT EVENTS:  T(C): 36.3 (08-05-21 @ 20:38), Max: 36.5 (08-05-21 @ 06:03)  HR: 76 (08-05-21 @ 20:38) (62 - 76)  BP: 102/65 (08-05-21 @ 20:38) (102/65 - 114/73)  RR: 18 (08-05-21 @ 20:38) (18 - 18)  SpO2: 98% (08-05-21 @ 20:38) (97% - 100%)  Wt(kg): --  I&O's Summary    04 Aug 2021 07:01  -  05 Aug 2021 07:00  --------------------------------------------------------  IN: 1740 mL / OUT: 1050 mL / NET: 690 mL    05 Aug 2021 07:01  -  05 Aug 2021 21:30  --------------------------------------------------------  IN: 480 mL / OUT: 800 mL / NET: -320 mL        PAST MEDICAL & SURGICAL HISTORY:  COVID-19 vaccine series completed  6/2/21    DVT (deep venous thrombosis)    No significant past surgical history        SOCIAL HISTORY  Alcohol:  Tobacco:  Illicit substance use:    FAMILY HISTORY:    REVIEW OF SYSTEMS:  CONSTITUTIONAL: No fever, weight loss, or fatigue  EYES: No eye pain, visual disturbances, or discharge  ENMT:  No difficulty hearing, tinnitus, vertigo; No sinus or throat pain  NECK: No pain or stiffness  RESPIRATORY: No cough, wheezing, chills or hemoptysis; No shortness of breath  CARDIOVASCULAR: No chest pain, palpitations, dizziness, or leg swelling  GASTROINTESTINAL: No abdominal or epigastric pain. No nausea, vomiting, or hematemesis; No diarrhea or constipation. No melena or hematochezia.  GENITOURINARY: No dysuria, frequency, hematuria, or incontinence  NEUROLOGICAL: No headaches, memory loss, loss of strength, numbness, or tremors  SKIN: No itching, burning, rashes, or lesions   LYMPH NODES: No enlarged glands  ENDOCRINE: No heat or cold intolerance; No hair loss  MUSCULOSKELETAL: No joint pain or swelling; No muscle, back, or extremity pain  PSYCHIATRIC: No depression, anxiety, mood swings, or difficulty sleeping  HEME/LYMPH: No easy bruising, or bleeding gums  ALLERY AND IMMUNOLOGIC: No hives or eczema    RADIOLOGY & ADDITIONAL TESTS:    Imaging Personally Reviewed:  [ ] YES  [ ] NO    Consultant(s) Notes Reviewed:  [ ] YES  [ ] NO    PHYSICAL EXAM:  GENERAL: NAD, well-groomed, well-developed  HEAD:  Atraumatic, Normocephalic  EYES: EOMI, PERRLA, conjunctiva and sclera clear  ENMT: No tonsillar erythema, exudates, or enlargement; Moist mucous membranes, Good dentition, No lesions  NECK: Supple, No JVD, Normal thyroid  NERVOUS SYSTEM:  Alert & Oriented X3, Good concentration; Motor Strength 5/5 B/L upper and lower extremities; DTRs 2+ intact and symmetric  CHEST/LUNG: Clear to percussion bilaterally; No rales, rhonchi, wheezing, or rubs  HEART: Regular rate and rhythm; No murmurs, rubs, or gallops  ABDOMEN: Soft, Nontender, Nondistended; Bowel sounds present  EXTREMITIES:  2+ Peripheral Pulses, No clubbing, cyanosis, or edema  LYMPH: No lymphadenopathy noted  SKIN: No rashes or lesions    LABS:          PT/INR - ( 05 Aug 2021 07:16 )   PT: 24.6 sec;   INR: 2.12 ratio             CAPILLARY BLOOD GLUCOSE                MEDICATIONS  (STANDING):  enoxaparin Injectable 70 milliGRAM(s) SubCutaneous every 12 hours  pantoprazole    Tablet 40 milliGRAM(s) Oral before breakfast  warfarin 5 milliGRAM(s) Oral once    MEDICATIONS  (PRN):  benzocaine 15 mG/menthol 3.6 mG (Sugar-Free) Lozenge 1 Lozenge Oral every 8 hours PRN Sore Throat      Care Discussed with Consultants/Other Providers [ ] YES  [ ] NO

## 2021-08-05 NOTE — DISCHARGE NOTE PROVIDER - NSDCFUADDAPPT_GEN_ALL_CORE_FT
please follow up with DR Good's office on Monday 08/9/2021  for PT/INR check  please follow up with DR Good's office on Monday 08/9/2021  for PT/INR check

## 2021-08-05 NOTE — DISCHARGE NOTE PROVIDER - PROVIDER TOKENS
PROVIDER:[TOKEN:[[id number]]],FREE:[LAST:[Dr. Kale Whitt],FIRST:[Primary Care Doctor],PHONE:[phone]],FAX:[(   )    -],ADDRESS:[[address] [place] [address]]] PROVIDER:[TOKEN:[[id number]]],PROVIDER:[TOKEN:[[id number]]],FREE:[LAST:[Dr. Kale Whitt],FIRST:[Primary Care Doctor],PHONE:[phone]],FAX:[(   )    -],ADDRESS:[[address] [place] [address]]],PROVIDER:[TOKEN:[[id number]]]

## 2021-08-05 NOTE — PROGRESS NOTE ADULT - NSPROGADDITIONALINFOA_GEN_ALL_CORE
D/c planning once INR > 2
dispo: give coumadin 5 mg tonight , repeat INR in am, d/c home tomorrow, f/u with me in office for PT/INR   he can come to office on Monday , call [phone]

## 2021-08-05 NOTE — DISCHARGE NOTE PROVIDER - NSDCMRMEDTOKEN_GEN_ALL_CORE_FT
Xarelto Starter Pack 15 mg-20 mg oral kit: 1 tab(s) orally 2 times a day    pantoprazole 40 mg oral delayed release tablet: 1 tab(s) orally once a day (before a meal)  warfarin 4 mg oral tablet: 1 tab(s) orally once a day

## 2021-08-05 NOTE — PROGRESS NOTE ADULT - PROBLEM SELECTOR PLAN 1
CT angio chest shows pul embolism   pt. was started on xarelto since monday . says he didn't miss single dose   TTE  seen by hematologist   c/w full dose lovenox   bridge with coumadin : started 5 mg today   daily PT/INR

## 2021-08-05 NOTE — DISCHARGE NOTE PROVIDER - NSDCCPCAREPLAN_GEN_ALL_CORE_FT
PRINCIPAL DISCHARGE DIAGNOSIS  Diagnosis: Pulmonary embolus  Assessment and Plan of Treatment: Continue taking "blood thinners" as prescribed.  Blood thinners can result in abnormal bleeding and brushing.  Be mindful to avoid accidental trauma.  Participate in activities as prescribed.  If you develop new leg pain, swelling, and/or redness contact your healthcare provider.  Call your doctor if you experience lightheadedness, loss of consciousness, shortness of breath (especially with exercise), feel your heart racing or beating unusually, or experience frequent or abnormal bleeding.      SECONDARY DISCHARGE DIAGNOSES  Diagnosis: Deep vein thrombosis  Assessment and Plan of Treatment:      PRINCIPAL DISCHARGE DIAGNOSIS  Diagnosis: Pulmonary embolus  Assessment and Plan of Treatment: Continue taking "blood thinners" as prescribed.  Blood thinners can result in abnormal bleeding and brushing.  Be mindful to avoid accidental trauma.  Participate in activities as prescribed.  If you develop new leg pain, swelling, and/or redness contact your healthcare provider.  Call your doctor if you experience lightheadedness, loss of consciousness, shortness of breath (especially with exercise), feel your heart racing or beating unusually, or experience frequent or abnormal bleeding.      SECONDARY DISCHARGE DIAGNOSES  Diagnosis: Deep vein thrombosis  Assessment and Plan of Treatment: Take your "blood thinners" as prescribed.  Walking is encouraged, increase activity as tolerated.  If you develop new leg pain, swelling, and/or redness contact your healthcare provider.  If you develop new chest pain with difficulty breathing, a rapid heart rate and/or a feeling of passing out call emergency medical services 911.

## 2021-08-05 NOTE — DISCHARGE NOTE PROVIDER - DISCHARGE DATE
Rx hand carried to [facility].  Pt informed of message below from provider.      
phentermine      Last Written Prescription Date:  4/4/17  Last Fill Quantity: 30,   # refills: 1  Last Office Visit with [facility] prescribing provider: 03/13/17  Future Office visit:    Next 5 appointments (look out 90 days)     Aug 01, 2017  8:40 AM CDT   PHYSICAL with Moraima Kurtz MD   [facility]    [address]  [facility] [address]   [phone]                   Routing refill request to provider for review/approval because:  Drug not on the [facility], [facility] or [facility] refill protocol or controlled substance    
06-Aug-2021

## 2021-08-05 NOTE — PROGRESS NOTE ADULT - PROBLEM SELECTOR PLAN 2
was on xarelto   got PE on xarelto   transitioning to coumadin

## 2021-08-05 NOTE — DISCHARGE NOTE PROVIDER - HOSPITAL COURSE
31 M hx diagnosed provoked DVT (travelling, spent 24hrs stationary in airport otw to [place]) p/w SOB and mild chest discomfort since last night. Chest discomfort subcostal "need to take a deep breath". No diaphoresis/ N/V. No radiation of chest discomfort. No change with exertion.     Problem/Plan - 1:  ·  Problem: Pulmonary embolus.  Plan: CT angio + PE   - TTE normal   - has been on Xarelto since last week for DVT   - f/u heme consult   -ac with full dose lovenox to coumadin bridge  - likely hypercoag state  - Warfarin to maintain INR 2.5-3 per heme  -monitor INR. today's 2.12     Problem/Plan - 2:  ·  Problem: Deep vein thrombosis. provoked by long flight  has been on xarelto   on full dose lovenox ac   - ECHO neg for PFO   - Warfarin to maintain INR 2.5-3 per heme     Problem/Plan - 3:  ·  Problem: GI ppx pantoprazole 31 M hx diagnosed provoked DVT (travelling, spent 24hrs stationary in airport otw to [place]) p/w SOB and mild chest discomfort since last night  PE and DVT   Hem consulted Coumadin added  since treatment failure to Xarelto   Pt to follow up with DR Good  for PT/INR check     le 31 M hx diagnosed provoked DVT (travelling, spent 24hrs stationary in airport otw to [place]) p/w SOB and mild chest discomfort since last night  PE and DVT   CTA chest  with Pulmonary emboli within the subsegmental branches of the posterior segment of the right lower lobe pulmonary artery.  CT head : No acute intracranial hemorrhage, mass effect, or shift of the midline structures.   LE dopplers Left common femoral deep venous thrombosis extending into the left greater saphenous vein.   Hem  consulted : Unclear if this is failure of anticoagulation (Xarelto) or if PE was previously present (CTA was not done on 7/24)   Pt was  transition Lovenox to Warfarin to maintain INR 2.5-3   f/u anticardiolipin antibody and beta-2 gylcoprotein  .  Dr. Ramon Schmidt of [facility]  ECHO negative for PFO , with  normal RVF   Pt instructed to follow up with DR Good's office on Monday 08/9  for PT/INR check and further coumadin dosing

## 2021-08-06 ENCOUNTER — TRANSCRIPTION ENCOUNTER (OUTPATIENT)
Age: 31
End: 2021-08-06

## 2021-08-06 VITALS
RESPIRATION RATE: 18 BRPM | HEART RATE: 51 BPM | SYSTOLIC BLOOD PRESSURE: 119 MMHG | DIASTOLIC BLOOD PRESSURE: 79 MMHG | OXYGEN SATURATION: 100 % | TEMPERATURE: 98 F

## 2021-08-06 LAB
DEPRECATED CARDIOLIPIN IGA SER: <5 APL — SIGNIFICANT CHANGE UP (ref 0–12.5)
HCT VFR BLD CALC: 45.4 % — SIGNIFICANT CHANGE UP (ref 39–50)
HGB BLD-MCNC: 14.7 G/DL — SIGNIFICANT CHANGE UP (ref 13–17)
INR BLD: 3.33 RATIO — HIGH (ref 0.88–1.16)
MCHC RBC-ENTMCNC: 29.7 PG — SIGNIFICANT CHANGE UP (ref 27–34)
MCHC RBC-ENTMCNC: 32.4 GM/DL — SIGNIFICANT CHANGE UP (ref 32–36)
MCV RBC AUTO: 91.7 FL — SIGNIFICANT CHANGE UP (ref 80–100)
NRBC # BLD: 0 /100 WBCS — SIGNIFICANT CHANGE UP (ref 0–0)
PLATELET # BLD AUTO: 315 K/UL — SIGNIFICANT CHANGE UP (ref 150–400)
PROTHROM AB SERPL-ACNC: 37.7 SEC — HIGH (ref 10.6–13.6)
RBC # BLD: 4.95 M/UL — SIGNIFICANT CHANGE UP (ref 4.2–5.8)
RBC # FLD: 12.1 % — SIGNIFICANT CHANGE UP (ref 10.3–14.5)
WBC # BLD: 5.86 K/UL — SIGNIFICANT CHANGE UP (ref 3.8–10.5)
WBC # FLD AUTO: 5.86 K/UL — SIGNIFICANT CHANGE UP (ref 3.8–10.5)

## 2021-08-06 PROCEDURE — 86147 CARDIOLIPIN ANTIBODY EA IG: CPT

## 2021-08-06 PROCEDURE — 81240 F2 GENE: CPT

## 2021-08-06 PROCEDURE — 85027 COMPLETE CBC AUTOMATED: CPT

## 2021-08-06 PROCEDURE — 85303 CLOT INHIBIT PROT C ACTIVITY: CPT

## 2021-08-06 PROCEDURE — 71275 CT ANGIOGRAPHY CHEST: CPT | Mod: MA

## 2021-08-06 PROCEDURE — 84100 ASSAY OF PHOSPHORUS: CPT

## 2021-08-06 PROCEDURE — 86146 BETA-2 GLYCOPROTEIN ANTIBODY: CPT

## 2021-08-06 PROCEDURE — 80053 COMPREHEN METABOLIC PANEL: CPT

## 2021-08-06 PROCEDURE — 85300 ANTITHROMBIN III ACTIVITY: CPT

## 2021-08-06 PROCEDURE — 86769 SARS-COV-2 COVID-19 ANTIBODY: CPT

## 2021-08-06 PROCEDURE — 85730 THROMBOPLASTIN TIME PARTIAL: CPT

## 2021-08-06 PROCEDURE — 84484 ASSAY OF TROPONIN QUANT: CPT

## 2021-08-06 PROCEDURE — 93306 TTE W/DOPPLER COMPLETE: CPT

## 2021-08-06 PROCEDURE — 99291 CRITICAL CARE FIRST HOUR: CPT | Mod: 25

## 2021-08-06 PROCEDURE — 83735 ASSAY OF MAGNESIUM: CPT

## 2021-08-06 PROCEDURE — 85610 PROTHROMBIN TIME: CPT

## 2021-08-06 PROCEDURE — 71046 X-RAY EXAM CHEST 2 VIEWS: CPT

## 2021-08-06 PROCEDURE — 81241 F5 GENE: CPT

## 2021-08-06 PROCEDURE — U0003: CPT

## 2021-08-06 PROCEDURE — U0005: CPT

## 2021-08-06 PROCEDURE — 85025 COMPLETE CBC W/AUTO DIFF WBC: CPT

## 2021-08-06 PROCEDURE — 83690 ASSAY OF LIPASE: CPT

## 2021-08-06 PROCEDURE — 70450 CT HEAD/BRAIN W/O DYE: CPT

## 2021-08-06 PROCEDURE — 85306 CLOT INHIBIT PROT S FREE: CPT

## 2021-08-06 RX ORDER — PANTOPRAZOLE SODIUM 20 MG/1
1 TABLET, DELAYED RELEASE ORAL
Qty: 0 | Refills: 0 | DISCHARGE
Start: 2021-08-06

## 2021-08-06 RX ADMIN — ENOXAPARIN SODIUM 70 MILLIGRAM(S): 100 INJECTION SUBCUTANEOUS at 12:46

## 2021-08-06 RX ADMIN — PANTOPRAZOLE SODIUM 40 MILLIGRAM(S): 20 TABLET, DELAYED RELEASE ORAL at 06:15

## 2021-08-06 NOTE — PROGRESS NOTE ADULT - REASON FOR ADMISSION
SOB , pleuritic chest pain

## 2021-08-06 NOTE — PROGRESS NOTE ADULT - SUBJECTIVE AND OBJECTIVE BOX
Patient is a 31y old  Male who presents with a chief complaint of SOB , pleuritic chest pain (05 Aug 2021 21:30)      INTERVAL HISTORY:     TELEMETRY Personally reviewed:    REVIEW OF SYSTEMS:   CONSTITUTIONAL: No weakness  EYES/ENT: No visual changes; No throat pain  Neck: No pain or stiffness  Respiratory: No cough, wheezing, No shortness of breath  CARDIOVASCULAR: no chest pain or palpitations  GASTROINTESTINAL: No abdominal pain, no nausea, vomiting or hematemesis  GENITOURINARY: No dysuria, frequency or hematuria  NEUROLOGICAL: No stroke like symptoms  SKIN: No rashes    	  MEDICATIONS:        PHYSICAL EXAM:  T(C): 36.5 (08-06-21 @ 12:46), Max: 36.6 (08-06-21 @ 06:40)  HR: 51 (08-06-21 @ 12:46) (51 - 76)  BP: 119/79 (08-06-21 @ 12:46) (100/55 - 182/72)  RR: 18 (08-06-21 @ 12:46) (18 - 18)  SpO2: 100% (08-06-21 @ 12:46) (96% - 100%)  Wt(kg): --  I&O's Summary    05 Aug 2021 07:01  -  06 Aug 2021 07:00  --------------------------------------------------------  IN: 1200 mL / OUT: 800 mL / NET: 400 mL          Appearance: In no distress	  HEENT:    PERRL, EOMI	  Cardiovascular:  S1 S2, No JVD  Respiratory: Lungs clear to auscultation	  Gastrointestinal:  Soft, Non-tender, + BS	  Vascularature:  No edema of LE  Psychiatric: Appropriate affect   Neuro: no acute focal deficits                               14.7   5.86  )-----------( 315      ( 06 Aug 2021 07:33 )             45.4               Labs personally reviewed      ASSESSMENT/PLAN: 	    31 M hx diagnosed provoked DVT (travelling, spent 24hrs stationary in airport otw to [place]) p/w SOB and mild chest discomfort since last night. Chest discomfort subcostal "need to take a deep breath". No diaphoresis/ N/V. No radiation of chest discomfort. No change with exertion.    Problem/Plan - 1:  ·  Problem: Pulmonary embolus.  Plan: CT angio + PE   - TTE normal   - has been on Xarelto since last week for DVT   - f/u heme consult   -ac with full dose lovenox to coumadin bridge  - likely hypercoag state  - Warfarin to maintain INR 2.5-3 per heme  -monitor INR. today's 3.33  - Will get warfarin dose tonight then INR recheck tomorrow before d/c home     Problem/Plan - 2:  ·  Problem: Deep vein thrombosis. provoked by long flight  has been on xarelto   on full dose lovenox ac   - ECHO neg for PFO   - Warfarin to maintain INR 2.5-3 per heme    Problem/Plan - 3:  ·  Problem: GI ppx pantoprazole         Niya Carmona FN-BC   Giovanni Agosto DO [facility]  Cardiovascular Medicine  [address], [address]  Office: [phone]  Cell: [phone] Patient is a 31y old  Male who presents with a chief complaint of SOB , pleuritic chest pain (05 Aug 2021 21:30)      INTERVAL HISTORY: feels ok      REVIEW OF SYSTEMS:   CONSTITUTIONAL: No weakness  EYES/ENT: No visual changes; No throat pain  Neck: No pain or stiffness  Respiratory: No cough, wheezing, No shortness of breath  CARDIOVASCULAR: no chest pain or palpitations  GASTROINTESTINAL: No abdominal pain, no nausea, vomiting or hematemesis  GENITOURINARY: No dysuria, frequency or hematuria  NEUROLOGICAL: No stroke like symptoms  SKIN: No rashes    	  MEDICATIONS:        PHYSICAL EXAM:  T(C): 36.5 (08-06-21 @ 12:46), Max: 36.6 (08-06-21 @ 06:40)  HR: 51 (08-06-21 @ 12:46) (51 - 76)  BP: 119/79 (08-06-21 @ 12:46) (100/55 - 182/72)  RR: 18 (08-06-21 @ 12:46) (18 - 18)  SpO2: 100% (08-06-21 @ 12:46) (96% - 100%)  Wt(kg): --  I&O's Summary    05 Aug 2021 07:01  -  06 Aug 2021 07:00  --------------------------------------------------------  IN: 1200 mL / OUT: 800 mL / NET: 400 mL          Appearance: In no distress	  HEENT:    PERRL, EOMI	  Cardiovascular:  S1 S2, No JVD  Respiratory: Lungs clear to auscultation	  Gastrointestinal:  Soft, Non-tender, + BS	  Vascularature:  No edema of LE  Psychiatric: Appropriate affect   Neuro: no acute focal deficits                               14.7   5.86  )-----------( 315      ( 06 Aug 2021 07:33 )             45.4           Labs personally reviewed      ASSESSMENT/PLAN: 	    31 M hx diagnosed provoked DVT (travelling, spent 24hrs stationary in airport otw to [place]) p/w SOB and mild chest discomfort since last night. Chest discomfort subcostal "need to take a deep breath". No diaphoresis/ N/V. No radiation of chest discomfort. No change with exertion.    Problem/Plan - 1:  ·  Problem: Pulmonary embolus.  Plan: CT angio + PE   - TTE normal   - has been on Xarelto since last week for DVT   - f/u heme consult   -ac with full dose lovenox to coumadin bridge  - likely hypercoag state  - Warfarin to maintain INR 2.5-3 per heme  - monitor INR. today's 3.33  - Will get warfarin dose tonight then INR recheck tomorrow before d/c home     Problem/Plan - 2:  ·  Problem: Deep vein thrombosis. provoked by long flight  has been on xarelto   on full dose lovenox ac   - ECHO neg for PFO   - Warfarin to maintain INR 2.5-3 per heme    Problem/Plan - 3:  ·  Problem: GI ppx pantoprazole       Discharge planning for today with OP INR check with Dr Good on Monday scheduled       Niya Carmona FN-BC   Giovanni Agosto DO [facility]  Cardiovascular Medicine  [address], [address]  Office: [phone]  Cell: [phone]

## 2021-08-06 NOTE — PROGRESS NOTE ADULT - PROVIDER SPECIALTY LIST ADULT
Cardiology
Internal Medicine
Cardiology
Internal Medicine

## 2021-08-06 NOTE — CHART NOTE - NSCHARTNOTEFT_GEN_A_CORE
Pt medically cleared for d/c by DR Good   Pt to c/w Coumadin 4 mg starting from 08/6   Pt instructed to follow up with DR Good's office on Monday   Britton Ramirez NP-C [id number]

## 2021-08-06 NOTE — PROGRESS NOTE ADULT - ATTENDING COMMENTS
Pt care and plan discussed and reviewed with NP. Plan as outlined above edited by me to reflect our discussion.
Pt care and plan discussed and reviewed with NP. Plan as outlined above edited by me to reflect our discussion. I had a prolonged conversation with the patient regarding hospital course, differential diagnosis and results of diagnostic tests.  Plan of care discussed with patient after the evaluation. Patient expresses clear understanding and satisfaction with the plan of care. Sixty five minutes spent on encounter, of which more than fifty percent of the encounter was spent on counseling and/or coordinating care by the attending physician.
Pt care and plan discussed and reviewed with NP. Plan as outlined above edited by me to reflect our discussion.

## 2021-08-06 NOTE — DISCHARGE NOTE NURSING/CASE MANAGEMENT/SOCIAL WORK - PATIENT PORTAL LINK FT
You can access the FollowMyHealth Patient Portal offered by [facility] by registering at the following website: [email or web address]. By joining [employer]’s FollowMyHealth portal, you will also be able to view your health information using other applications (apps) compatible with our system.

## 2021-08-07 RX ORDER — WARFARIN SODIUM 2.5 MG/1
1 TABLET ORAL
Qty: 30 | Refills: 0
Start: 2021-08-07 | End: 2021-09-05

## 2021-12-13 ENCOUNTER — EMERGENCY (EMERGENCY)
Facility: HOSPITAL | Age: 31
LOS: 1 days | Discharge: HOME | End: 2021-12-13
Attending: EMERGENCY MEDICINE
Payer: COMMERCIAL

## 2021-12-13 VITALS
OXYGEN SATURATION: 98 % | TEMPERATURE: 98 F | HEART RATE: 56 BPM | RESPIRATION RATE: 16 BRPM | SYSTOLIC BLOOD PRESSURE: 123 MMHG | DIASTOLIC BLOOD PRESSURE: 77 MMHG

## 2021-12-13 VITALS
WEIGHT: 160.06 LBS | HEIGHT: 66 IN | SYSTOLIC BLOOD PRESSURE: 156 MMHG | DIASTOLIC BLOOD PRESSURE: 105 MMHG | HEART RATE: 77 BPM | RESPIRATION RATE: 21 BRPM | OXYGEN SATURATION: 99 % | TEMPERATURE: 98 F

## 2021-12-13 PROBLEM — I82.409 ACUTE EMBOLISM AND THROMBOSIS OF UNSPECIFIED DEEP VEINS OF UNSPECIFIED LOWER EXTREMITY: Chronic | Status: ACTIVE | Noted: 2021-08-01

## 2021-12-13 LAB
ALBUMIN SERPL ELPH-MCNC: 4.8 G/DL — SIGNIFICANT CHANGE UP (ref 3.3–5)
ALP SERPL-CCNC: 95 U/L — SIGNIFICANT CHANGE UP (ref 40–120)
ALT FLD-CCNC: 19 U/L — SIGNIFICANT CHANGE UP (ref 10–45)
ANION GAP SERPL CALC-SCNC: 13 MMOL/L — SIGNIFICANT CHANGE UP (ref 5–17)
APTT BLD: 66.9 SEC — HIGH (ref 27.5–35.5)
AST SERPL-CCNC: 33 U/L — SIGNIFICANT CHANGE UP (ref 10–40)
BASOPHILS # BLD AUTO: 0.03 K/UL — SIGNIFICANT CHANGE UP (ref 0–0.2)
BASOPHILS NFR BLD AUTO: 0.6 % — SIGNIFICANT CHANGE UP (ref 0–2)
BILIRUB SERPL-MCNC: 0.5 MG/DL — SIGNIFICANT CHANGE UP (ref 0.2–1.2)
BUN SERPL-MCNC: 19 MG/DL — SIGNIFICANT CHANGE UP (ref 7–23)
CALCIUM SERPL-MCNC: 10.3 MG/DL — SIGNIFICANT CHANGE UP (ref 8.4–10.5)
CHLORIDE SERPL-SCNC: 102 MMOL/L — SIGNIFICANT CHANGE UP (ref 96–108)
CO2 SERPL-SCNC: 24 MMOL/L — SIGNIFICANT CHANGE UP (ref 22–31)
CREAT SERPL-MCNC: 0.98 MG/DL — SIGNIFICANT CHANGE UP (ref 0.5–1.3)
EOSINOPHIL # BLD AUTO: 0.11 K/UL — SIGNIFICANT CHANGE UP (ref 0–0.5)
EOSINOPHIL NFR BLD AUTO: 2.1 % — SIGNIFICANT CHANGE UP (ref 0–6)
GLUCOSE SERPL-MCNC: 89 MG/DL — SIGNIFICANT CHANGE UP (ref 70–99)
HCT VFR BLD CALC: 46.6 % — SIGNIFICANT CHANGE UP (ref 39–50)
HGB BLD-MCNC: 15.6 G/DL — SIGNIFICANT CHANGE UP (ref 13–17)
IMM GRANULOCYTES NFR BLD AUTO: 0.2 % — SIGNIFICANT CHANGE UP (ref 0–1.5)
INR BLD: 2.84 RATIO — HIGH (ref 0.88–1.16)
LYMPHOCYTES # BLD AUTO: 1.34 K/UL — SIGNIFICANT CHANGE UP (ref 1–3.3)
LYMPHOCYTES # BLD AUTO: 25.8 % — SIGNIFICANT CHANGE UP (ref 13–44)
MCHC RBC-ENTMCNC: 29.8 PG — SIGNIFICANT CHANGE UP (ref 27–34)
MCHC RBC-ENTMCNC: 33.5 GM/DL — SIGNIFICANT CHANGE UP (ref 32–36)
MCV RBC AUTO: 88.9 FL — SIGNIFICANT CHANGE UP (ref 80–100)
MONOCYTES # BLD AUTO: 0.36 K/UL — SIGNIFICANT CHANGE UP (ref 0–0.9)
MONOCYTES NFR BLD AUTO: 6.9 % — SIGNIFICANT CHANGE UP (ref 2–14)
NEUTROPHILS # BLD AUTO: 3.34 K/UL — SIGNIFICANT CHANGE UP (ref 1.8–7.4)
NEUTROPHILS NFR BLD AUTO: 64.4 % — SIGNIFICANT CHANGE UP (ref 43–77)
NRBC # BLD: 0 /100 WBCS — SIGNIFICANT CHANGE UP (ref 0–0)
NT-PROBNP SERPL-SCNC: 23 PG/ML — SIGNIFICANT CHANGE UP (ref 0–300)
PLATELET # BLD AUTO: 286 K/UL — SIGNIFICANT CHANGE UP (ref 150–400)
POTASSIUM SERPL-MCNC: 4.5 MMOL/L — SIGNIFICANT CHANGE UP (ref 3.5–5.3)
POTASSIUM SERPL-SCNC: 4.5 MMOL/L — SIGNIFICANT CHANGE UP (ref 3.5–5.3)
PROT SERPL-MCNC: 7.6 G/DL — SIGNIFICANT CHANGE UP (ref 6–8.3)
PROTHROM AB SERPL-ACNC: 32.4 SEC — HIGH (ref 10.6–13.6)
RBC # BLD: 5.24 M/UL — SIGNIFICANT CHANGE UP (ref 4.2–5.8)
RBC # FLD: 11.9 % — SIGNIFICANT CHANGE UP (ref 10.3–14.5)
SODIUM SERPL-SCNC: 139 MMOL/L — SIGNIFICANT CHANGE UP (ref 135–145)
TROPONIN T, HIGH SENSITIVITY RESULT: <6 NG/L — SIGNIFICANT CHANGE UP (ref 0–51)
WBC # BLD: 5.19 K/UL — SIGNIFICANT CHANGE UP (ref 3.8–10.5)
WBC # FLD AUTO: 5.19 K/UL — SIGNIFICANT CHANGE UP (ref 3.8–10.5)

## 2021-12-13 PROCEDURE — 93010 ELECTROCARDIOGRAM REPORT: CPT

## 2021-12-13 PROCEDURE — 71275 CT ANGIOGRAPHY CHEST: CPT | Mod: 26,MA

## 2021-12-13 PROCEDURE — 99284 EMERGENCY DEPT VISIT MOD MDM: CPT | Mod: 25

## 2021-12-13 PROCEDURE — 93005 ELECTROCARDIOGRAM TRACING: CPT

## 2021-12-13 PROCEDURE — 84484 ASSAY OF TROPONIN QUANT: CPT

## 2021-12-13 PROCEDURE — 71045 X-RAY EXAM CHEST 1 VIEW: CPT

## 2021-12-13 PROCEDURE — 71275 CT ANGIOGRAPHY CHEST: CPT | Mod: MA

## 2021-12-13 PROCEDURE — 85730 THROMBOPLASTIN TIME PARTIAL: CPT

## 2021-12-13 PROCEDURE — 83880 ASSAY OF NATRIURETIC PEPTIDE: CPT

## 2021-12-13 PROCEDURE — 99285 EMERGENCY DEPT VISIT HI MDM: CPT | Mod: 25

## 2021-12-13 PROCEDURE — 80053 COMPREHEN METABOLIC PANEL: CPT

## 2021-12-13 PROCEDURE — 85610 PROTHROMBIN TIME: CPT

## 2021-12-13 PROCEDURE — 71045 X-RAY EXAM CHEST 1 VIEW: CPT | Mod: 26

## 2021-12-13 PROCEDURE — 85025 COMPLETE CBC W/AUTO DIFF WBC: CPT

## 2021-12-13 NOTE — ED PROVIDER NOTE - CLINICAL SUMMARY MEDICAL DECISION MAKING FREE TEXT BOX
32 yo M with hx of PE and DVT 3 months ago on Warfarin presents with chest pressure and sob since 10pm, similar to prior PE presentation. VS WNL. Well appearance. Mod to high risk for PE. vs pericarditis. Unlikely ACS. CT angio, trop and bnp for signs of heart strain.

## 2021-12-13 NOTE — ED ADULT NURSE NOTE - OBJECTIVE STATEMENT
30 y/o male with chest pressure and shortness of breath. History of DVT and PE on coumadin. AOx4, ambulatory. Patient on cardiac monitor. IV placed, awaiting for MD evaluation.

## 2021-12-13 NOTE — ED PROVIDER NOTE - OBJECTIVE STATEMENT
30 yo M with hx of PE and DVT 3 months ago on Warfarin presents with chest pressure and sob since 10pm, similar to prior PE presentation. Reports substernal chest pressure, non-radiating, not exertional or pleuritic, worse when laying flat. No palpitations or leg edema. No smoker. No recent immobilizations. No fever or cough. No recent URI.

## 2021-12-13 NOTE — ED ADULT NURSE NOTE - JUGULAR VENOUS DISTENTION
absent Sarecycline Counseling: Patient advised regarding possible photosensitivity and discoloration of the teeth, skin, lips, tongue and gums.  Patient instructed to avoid sunlight, if possible.  When exposed to sunlight, patients should wear protective clothing, sunglasses, and sunscreen.  The patient was instructed to call the office immediately if the following severe adverse effects occur:  hearing changes, easy bruising/bleeding, severe headache, or vision changes.  The patient verbalized understanding of the proper use and possible adverse effects of sarecycline.  All of the patient's questions and concerns were addressed.

## 2021-12-13 NOTE — ED PROVIDER NOTE - PHYSICAL EXAMINATION
Gen: AAOx3, non-toxic  Head: NCAT  HEENT: EOMI, oral mucosa moist, normal conjunctiva  Lung: CTAB, no respiratory distress, no wheezes/rhonchi/rales B/L, speaking in full sentences  CV: RRR, no murmurs, rubs or gallops. No leg edema.   Abd: soft, NTND, no guarding, no CVA tenderness  MSK: no visible deformities  Neuro: No focal sensory or motor deficits, normal CN exam   Skin: Warm, well perfused, no rash  Psych: normal affect.

## 2021-12-13 NOTE — ED PROVIDER NOTE - PATIENT PORTAL LINK FT
You can access the FollowMyHealth Patient Portal offered by [facility] by registering at the following website: [email or web address]. By joining [employer]’s FollowMyHealth portal, you will also be able to view your health information using other applications (apps) compatible with our system.

## 2021-12-13 NOTE — ED PROVIDER NOTE - ATTENDING CONTRIBUTION TO CARE
pt's workup significant for no new PE, no worsening clot burder, neg trop/ekg thus unlikely to be myocarditis/pericarditis or ACS, no apparent effusion on CT, no pna or fluid. Pt's symptoms improved without intervention. Multiple diagnoses were considered during patient's evaluation today. While exact etiology of symptoms is unclear, there appears to be no emergent process today that would require further emergent or inpatient management. Pt is safe for dc with outpt f/u and return instructions if symptoms worsen.

## 2021-12-13 NOTE — ED PROVIDER NOTE - NSFOLLOWUPINSTRUCTIONS_ED_ALL_ED_FT
You were seen in the ED for chest pain.   The following labs/imaging were obtained: see attached (if applicable)  Continue home medications (if any).   Return to the ED if you develop recurrent chest pain, shortness of breath, worsening or new concerning symptoms.  Follow up with your primary care in 2-3 days.   Discussed with pt results of work up, strict return precautions, and need for follow up.  Pt expressed understanding and agrees with plan.    Chest Pain    Chest pain can be caused by many different conditions which may or may not be dangerous. Causes include heartburn, lung infections, heart attack, blood clot in lungs, skin infections, strain or damage to muscle, cartilage, or bones, etc. In addition to a history and physical examination, an electrocardiogram (ECG) or other lab tests may have been performed to determine the cause of your chest pain. Follow up with your primary care provider or with a cardiologist as instructed.     SEEK IMMEDIATE MEDICAL CARE IF YOU HAVE ANY OF THE FOLLOWING SYMPTOMS: worsening chest pain, coughing up blood, unexplained back/neck/jaw pain, severe abdominal pain, dizziness or lightheadedness, fainting, shortness of breath, sweaty or clammy skin, vomiting, or racing heart beat. These symptoms may represent a serious problem that is an emergency. Do not wait to see if the symptoms will go away. Get medical help right away. Call 911 and do not drive yourself to the hospital.

## 2021-12-13 NOTE — ED ADULT NURSE NOTE - HOW OFTEN DO YOU HAVE SIX OR MORE DRINKS ON ONE OCCASION?
Urgent NP assessment for patient with remote mTBI, persistent symptoms, emotional lability, and on going litigation Never

## 2022-01-03 ENCOUNTER — EMERGENCY (EMERGENCY)
Facility: HOSPITAL | Age: 32
LOS: 1 days | Discharge: HOME | End: 2022-01-03
Attending: EMERGENCY MEDICINE
Payer: COMMERCIAL

## 2022-01-03 VITALS
DIASTOLIC BLOOD PRESSURE: 67 MMHG | HEART RATE: 79 BPM | TEMPERATURE: 98 F | WEIGHT: 147.05 LBS | OXYGEN SATURATION: 98 % | SYSTOLIC BLOOD PRESSURE: 120 MMHG | HEIGHT: 66 IN | RESPIRATION RATE: 18 BRPM

## 2022-01-03 VITALS
SYSTOLIC BLOOD PRESSURE: 117 MMHG | HEART RATE: 65 BPM | OXYGEN SATURATION: 100 % | DIASTOLIC BLOOD PRESSURE: 77 MMHG | TEMPERATURE: 98 F | RESPIRATION RATE: 18 BRPM

## 2022-01-03 LAB
ALBUMIN SERPL ELPH-MCNC: 4.7 G/DL — SIGNIFICANT CHANGE UP (ref 3.3–5)
ALP SERPL-CCNC: 125 U/L — HIGH (ref 40–120)
ALT FLD-CCNC: 15 U/L — SIGNIFICANT CHANGE UP (ref 10–45)
ANION GAP SERPL CALC-SCNC: 13 MMOL/L — SIGNIFICANT CHANGE UP (ref 5–17)
APTT BLD: 51.2 SEC — HIGH (ref 27.5–35.5)
AST SERPL-CCNC: 16 U/L — SIGNIFICANT CHANGE UP (ref 10–40)
BASOPHILS # BLD AUTO: 0.03 K/UL — SIGNIFICANT CHANGE UP (ref 0–0.2)
BASOPHILS NFR BLD AUTO: 0.6 % — SIGNIFICANT CHANGE UP (ref 0–2)
BILIRUB SERPL-MCNC: 0.4 MG/DL — SIGNIFICANT CHANGE UP (ref 0.2–1.2)
BUN SERPL-MCNC: 22 MG/DL — SIGNIFICANT CHANGE UP (ref 7–23)
CALCIUM SERPL-MCNC: 9.7 MG/DL — SIGNIFICANT CHANGE UP (ref 8.4–10.5)
CHLORIDE SERPL-SCNC: 102 MMOL/L — SIGNIFICANT CHANGE UP (ref 96–108)
CO2 SERPL-SCNC: 24 MMOL/L — SIGNIFICANT CHANGE UP (ref 22–31)
CREAT SERPL-MCNC: 1 MG/DL — SIGNIFICANT CHANGE UP (ref 0.5–1.3)
EOSINOPHIL # BLD AUTO: 0.1 K/UL — SIGNIFICANT CHANGE UP (ref 0–0.5)
EOSINOPHIL NFR BLD AUTO: 1.9 % — SIGNIFICANT CHANGE UP (ref 0–6)
GLUCOSE SERPL-MCNC: 91 MG/DL — SIGNIFICANT CHANGE UP (ref 70–99)
HCT VFR BLD CALC: 45.8 % — SIGNIFICANT CHANGE UP (ref 39–50)
HGB BLD-MCNC: 15.2 G/DL — SIGNIFICANT CHANGE UP (ref 13–17)
IMM GRANULOCYTES NFR BLD AUTO: 0.4 % — SIGNIFICANT CHANGE UP (ref 0–1.5)
INR BLD: 2.49 RATIO — HIGH (ref 0.88–1.16)
LYMPHOCYTES # BLD AUTO: 1.54 K/UL — SIGNIFICANT CHANGE UP (ref 1–3.3)
LYMPHOCYTES # BLD AUTO: 29.8 % — SIGNIFICANT CHANGE UP (ref 13–44)
MCHC RBC-ENTMCNC: 29.5 PG — SIGNIFICANT CHANGE UP (ref 27–34)
MCHC RBC-ENTMCNC: 33.2 GM/DL — SIGNIFICANT CHANGE UP (ref 32–36)
MCV RBC AUTO: 88.8 FL — SIGNIFICANT CHANGE UP (ref 80–100)
MONOCYTES # BLD AUTO: 0.39 K/UL — SIGNIFICANT CHANGE UP (ref 0–0.9)
MONOCYTES NFR BLD AUTO: 7.6 % — SIGNIFICANT CHANGE UP (ref 2–14)
NEUTROPHILS # BLD AUTO: 3.08 K/UL — SIGNIFICANT CHANGE UP (ref 1.8–7.4)
NEUTROPHILS NFR BLD AUTO: 59.7 % — SIGNIFICANT CHANGE UP (ref 43–77)
NRBC # BLD: 0 /100 WBCS — SIGNIFICANT CHANGE UP (ref 0–0)
PLATELET # BLD AUTO: 277 K/UL — SIGNIFICANT CHANGE UP (ref 150–400)
POTASSIUM SERPL-MCNC: 3.8 MMOL/L — SIGNIFICANT CHANGE UP (ref 3.5–5.3)
POTASSIUM SERPL-SCNC: 3.8 MMOL/L — SIGNIFICANT CHANGE UP (ref 3.5–5.3)
PROT SERPL-MCNC: 7.5 G/DL — SIGNIFICANT CHANGE UP (ref 6–8.3)
PROTHROM AB SERPL-ACNC: 28.6 SEC — HIGH (ref 10.6–13.6)
RBC # BLD: 5.16 M/UL — SIGNIFICANT CHANGE UP (ref 4.2–5.8)
RBC # FLD: 12.1 % — SIGNIFICANT CHANGE UP (ref 10.3–14.5)
SODIUM SERPL-SCNC: 139 MMOL/L — SIGNIFICANT CHANGE UP (ref 135–145)
WBC # BLD: 5.16 K/UL — SIGNIFICANT CHANGE UP (ref 3.8–10.5)
WBC # FLD AUTO: 5.16 K/UL — SIGNIFICANT CHANGE UP (ref 3.8–10.5)

## 2022-01-03 PROCEDURE — 93010 ELECTROCARDIOGRAM REPORT: CPT

## 2022-01-03 PROCEDURE — 85025 COMPLETE CBC W/AUTO DIFF WBC: CPT

## 2022-01-03 PROCEDURE — 80053 COMPREHEN METABOLIC PANEL: CPT

## 2022-01-03 PROCEDURE — 99284 EMERGENCY DEPT VISIT MOD MDM: CPT | Mod: 25

## 2022-01-03 PROCEDURE — 71275 CT ANGIOGRAPHY CHEST: CPT | Mod: 26,MA

## 2022-01-03 PROCEDURE — 85610 PROTHROMBIN TIME: CPT

## 2022-01-03 PROCEDURE — 71275 CT ANGIOGRAPHY CHEST: CPT | Mod: MA

## 2022-01-03 PROCEDURE — 99285 EMERGENCY DEPT VISIT HI MDM: CPT | Mod: 25

## 2022-01-03 PROCEDURE — U0003: CPT

## 2022-01-03 PROCEDURE — 93005 ELECTROCARDIOGRAM TRACING: CPT

## 2022-01-03 PROCEDURE — 36415 COLL VENOUS BLD VENIPUNCTURE: CPT

## 2022-01-03 PROCEDURE — 85730 THROMBOPLASTIN TIME PARTIAL: CPT

## 2022-01-03 PROCEDURE — U0005: CPT

## 2022-01-03 NOTE — ED PROVIDER NOTE - PROGRESS NOTE DETAILS
CTA chest reviewed with pt, ground glass opacities likely 2/2 COVID infection  Pt well appearing, no complaints at present. Has PMD to f/u with  INR therapeutic, advised continuing coumadin as prescribed by his doctor  Iso/quarantine protocol per CDC guidelines reviewed with pt, verbalized understanding  Return to ED instructions discussed.  O2 sat after ambulation was 99% and pt exhibited no signs of increased WOB  Daksha Marin PA-C

## 2022-01-03 NOTE — ED PROVIDER NOTE - NSFOLLOWUPINSTRUCTIONS_ED_ALL_ED_FT
1. You were seen in the Emergency Department for difficulty breathing and chest tightness. Your CT scan of your chest was negative for a pulmonary embolism but did show ground glass opacities consistent with your known COVID infection. Your INR was therapeutic. Please continue your coumadin as prescribed by your doctor.      2. You must quarantine/isolate for a period of 5 days and be fever free for at least 24 hours without the use of fever reducing medications as per current CDC guidelines before you can return to work/school or resume normal activities. Please make sure to wash your hands and continue to wear a mask to lessen the spread of this virus. Any additional information regarding COVID 19 can be found on the CDC website.     3. You may take Acetaminophen (Tylenol) 1,000 mg every 6 hours as needed for pain/fever.   Make sure to adequately hydrate and rest. Be sure that any other over the counter medication you are using does not contain the above mentioned medication in the ingredients.    4. Return to the ER as discussed if you develop any new or worsening symptoms. Follow up with your primary care doctor within 1 week.

## 2022-01-03 NOTE — ED PROVIDER NOTE - OBJECTIVE STATEMENT
31 yo M with a PMH of DVT/PE diagnosed in Sept 2020 (unknown cause) on coumadin 5 mg compliant with meds but missed one dose yesterday ([birth year] and forgot) p/w chest pressure and difficulty breathing/catching his breath. No provoking or alleviating factors. Chest pressure not pleuritic. Concerned that sxs feel similar to when he had a PE in the past. Last checked INR 12/31/21 and it was 2.7. Denies nausea, vomiting, fever, chills, SOB, palpitations, leg pain/swelling, abd pain, headache, dizziness, weakness, sore throat, congestion/runny nose, weakness. COVID vaccinated. No sick contacts.   PMD Dr. Norman Good  Quit vaping yesterday. 31 yo M with a PMH of DVT/PE diagnosed in Sept 2020 (unknown cause) on coumadin 5 mg compliant with meds but missed one dose yesterday (it [birth year] birthday and forgot) p/w chest pressure and difficulty breathing/catching his breath. No provoking or alleviating factors. Chest pressure not pleuritic. Concerned that sxs feel similar to when he had a PE in the past. Last checked INR 12/31/21 and it was 2.7. Tested COVID pos 12/26/21. Denies nausea, vomiting, fever, chills, SOB, palpitations, leg pain/swelling, abd pain, headache, dizziness, weakness, sore throat, congestion/runny nose, weakness. COVID vaccinated. No sick contacts.   PMD Dr. Norman Good  Quit vaping yesterday.

## 2022-01-03 NOTE — ED PROVIDER NOTE - ATTENDING CONTRIBUTION TO CARE
33 y/o m with pmhx DVT and PE diagnosed in Sept (unknown cause) still on coumadin 5 mg, presents for chest pressure and discomfort with breathing. hurts similar to PE. no leg swelling. no heart palp. missed 1 dose of meds.  no fever no cough.   pmd Dr. Norman Jackson.  no acute distress  HEENT:  perrl eomi  Lungs:  b/l bs  CVS: S1S2   Abd;  soft no tender no distention  Ext: no calf tend no leg edema no erythema  Neuro: aaox3 no focal deficit  MSK: strength 5/5 b/l upper and lower

## 2022-01-03 NOTE — ED PROVIDER NOTE - NSICDXPASTMEDICALHX_GEN_ALL_CORE_FT
PAST MEDICAL HISTORY:  COVID-19 vaccine series completed 6/2/21    DVT (deep venous thrombosis)     Pulmonary embolism

## 2022-01-03 NOTE — ED ADULT NURSE NOTE - OBJECTIVE STATEMENT
31 YO M arrived to the ed hx of DVT/PE diagnosed in Sept 2020 (unknown cause) on coumadin 5 mg compliant with meds but missed one dose yesterday (it [birth year] birthday and forgot); c/o cp and difficulty breathing/catching his breath. No provoking or alleviating factors. Chest pressure not pleuritic. Concerned that sxs feel similar to when he had a PE in the past. Last checked INR 12/31/21 and it was 2.7. Tested COVID pos 12/26/21. Denies nausea, vomiting, fever, chills, SOB, palpitations, leg pain/swelling, abd pain, headache, dizziness, weakness, sore throat, congestion/runny nose, weakness. COVID vaccinated. No sick contacts.

## 2022-01-03 NOTE — ED PROVIDER NOTE - PHYSICAL EXAMINATION
CONSTITUTIONAL: Well appearing and in no apparent distress.  ENT: Airway patent, moist mucous membranes.   EYES: Pupils equal, round and reactive to light. EOMI. Conjunctiva normal appearing.   CARDIAC: Normal rate, regular rhythm.  Heart sounds S1, S2.    RESPIRATORY: Breath sounds clear and equal bilaterally.   GASTROINTESTINAL: Abdomen soft, non-tender, not distended.  MUSCULOSKELETAL: Spine appears normal. No LE swelling. No calf TTP.  NEUROLOGICAL: Alert and oriented x3, no focal deficits, no motor or sensory deficits. 5/5 muscle strength throughout.  SKIN: Skin normal color, warm, dry and intact.   PSYCHIATRIC: Normal mood and affect.

## 2022-01-03 NOTE — ED PROVIDER NOTE - PATIENT PORTAL LINK FT
You can access the FollowMyHealth Patient Portal offered by [facility] by registering at the following website: [email or web address]. By joining [email or web address]’s FollowMyHealth portal, you will also be able to view your health information using other applications (apps) compatible with our system.

## 2022-01-03 NOTE — ED PROVIDER NOTE - CLINICAL SUMMARY MEDICAL DECISION MAKING FREE TEXT BOX
ATTG: : chest congestion concern for infection / pe worsening will check labs, check cta , re eval for dispo. ATTG: : chest congestion concern for infection / pe worsening will check labs, check cta , re eval for dispo.    chest pressure/difficulty breathing given hx will r/o recurrent PE  otherwise well appearing, no infectious symptoms but will r/o COVID  Labs  CTA chest  PMD f/u  Medication compliance  Reassess ATTG: : chest congestion concern for infection / pe worsening will check labs, check cta , re eval for dispo.    chest pressure/difficulty breathing given hx will r/o recurrent PE, also currently COVID pos which increases risk of VTE  otherwise well appearing  Labs  CTA chest  PMD f/u  Medication compliance  Reassess

## 2022-01-04 LAB — SARS-COV-2 RNA SPEC QL NAA+PROBE: DETECTED

## 2022-01-17 NOTE — ED ADULT NURSE NOTE - SUICIDE SCREENING QUESTION 2
Arrives reporting worsening chest pain and SOB still last Wednesday. Also reporting back pain.     Administrative documentation to document COVID-19 status.  COVID-19 status: Most recent Covid-19 test positive    Onset of Symptom Date:  1/14/2022      
No

## 2022-01-20 PROBLEM — I26.99 OTHER PULMONARY EMBOLISM WITHOUT ACUTE COR PULMONALE: Chronic | Status: ACTIVE | Noted: 2022-01-03

## 2022-01-20 PROBLEM — Z00.00 ENCOUNTER FOR PREVENTIVE HEALTH EXAMINATION: Status: ACTIVE | Noted: 2022-01-20

## 2022-02-14 ENCOUNTER — APPOINTMENT (OUTPATIENT)
Dept: VASCULAR SURGERY | Facility: CLINIC | Age: 32
End: 2022-02-14
Payer: COMMERCIAL

## 2022-02-14 ENCOUNTER — RESULT REVIEW (OUTPATIENT)
Age: 32
End: 2022-02-14

## 2022-02-14 VITALS
SYSTOLIC BLOOD PRESSURE: 120 MMHG | DIASTOLIC BLOOD PRESSURE: 74 MMHG | HEIGHT: 66 IN | WEIGHT: 145 LBS | BODY MASS INDEX: 23.3 KG/M2 | HEART RATE: 72 BPM

## 2022-02-14 DIAGNOSIS — I82.522 CHRONIC EMBOLISM AND THROMBOSIS OF LEFT ILIAC VEIN: ICD-10-CM

## 2022-02-14 PROCEDURE — 93970 EXTREMITY STUDY: CPT

## 2022-02-14 PROCEDURE — 99203 OFFICE O/P NEW LOW 30 MIN: CPT

## 2022-02-15 NOTE — ASSESSMENT
[FreeTextEntry1] : 31 yo male with a history of left lower extremity dvt started on xarelto in july then presented to the ed with PE and converted to coumadin presents for evaluation after repeat duplex shows left iliac vein dvt \par \par venous duplex shows chronically thrombosed left iliac vein that is contracted and small \par \par will arrange for cta with venous phase of the abd / pelvis to evaluate for any external compression as possible cause \par encouraged pt to wear compression stockings to reduce risk of postthrombotic syndrome

## 2022-02-15 NOTE — PHYSICAL EXAM
[2+] : left 2+ [No Rash or Lesion] : No rash or lesion [Alert] : alert [Calm] : calm [JVD] : no jugular venous distention  [Normal Breath Sounds] : Normal breath sounds [Normal Heart Sounds] : normal heart sounds [Ankle Swelling (On Exam)] : not present [Varicose Veins Of Lower Extremities] : not present [] : not present [Abdomen Masses] : No abdominal masses [Skin Ulcer] : no ulcer [de-identified] : appears well [de-identified] : mild calf tenderness right calf

## 2022-02-15 NOTE — CONSULT LETTER
[Dear  ___] : Dear  [unfilled], [Consult Letter:] : I had the pleasure of evaluating your patient, [unfilled]. [Please see my note below.] : Please see my note below. [Consult Closing:] : Thank you very much for allowing me to participate in the care of this patient.  If you have any questions, please do not hesitate to contact me. [Sincerely,] : Sincerely, [FreeTextEntry3] : Giovanni Black M.D., F.JUNIS., R.P.[email or web address].\par  of Vascular Surgery\par Assistant Professor of Radiology\par Director of Endovascular Program/ [facility]\par [facility]

## 2022-02-15 NOTE — HISTORY OF PRESENT ILLNESS
[FreeTextEntry1] : 33 yo male with a history of left lower extremity dvt started on xarelto in july then presented to the ed with PE and converted to coumadin presents for evaluation.  pt denies any previous history of dvt in the past.  the dvt developed after [place] the [place] \par pt denies any history of lower extremity edema or pain at this time\par pt states that he was evaluated by hematology and hypercoagulable workup was negative duplex was done and dvt was still noted in the left iliac vein.  \par pt reports occasional sob with exertion but no active cp \par

## 2022-03-04 ENCOUNTER — OUTPATIENT (OUTPATIENT)
Dept: OUTPATIENT SERVICES | Facility: HOSPITAL | Age: 32
LOS: 1 days | End: 2022-03-04
Payer: COMMERCIAL

## 2022-03-04 ENCOUNTER — APPOINTMENT (OUTPATIENT)
Dept: CT IMAGING | Facility: IMAGING CENTER | Age: 32
End: 2022-03-04
Payer: COMMERCIAL

## 2022-03-04 DIAGNOSIS — I82.522 CHRONIC EMBOLISM AND THROMBOSIS OF LEFT ILIAC VEIN: ICD-10-CM

## 2022-03-04 PROCEDURE — 74177 CT ABD & PELVIS W/CONTRAST: CPT

## 2022-03-04 PROCEDURE — 74177 CT ABD & PELVIS W/CONTRAST: CPT | Mod: 26

## 2022-06-06 ENCOUNTER — APPOINTMENT (OUTPATIENT)
Dept: VASCULAR SURGERY | Facility: CLINIC | Age: 32
End: 2022-06-06
Payer: COMMERCIAL

## 2022-06-06 VITALS
DIASTOLIC BLOOD PRESSURE: 76 MMHG | BODY MASS INDEX: 23.3 KG/M2 | WEIGHT: 145 LBS | SYSTOLIC BLOOD PRESSURE: 127 MMHG | HEIGHT: 66 IN | HEART RATE: 74 BPM

## 2022-06-06 PROCEDURE — 93971 EXTREMITY STUDY: CPT

## 2022-06-06 PROCEDURE — 99214 OFFICE O/P EST MOD 30 MIN: CPT

## 2022-06-06 RX ORDER — TRETINOIN 0.25 MG/G
0.03 CREAM TOPICAL
Qty: 45 | Refills: 0 | Status: ACTIVE | COMMUNITY
Start: 2022-04-11

## 2022-06-06 RX ORDER — CLINDAMYCIN PHOSPHATE 10 MG/ML
1 LOTION TOPICAL
Qty: 60 | Refills: 0 | Status: ACTIVE | COMMUNITY
Start: 2022-04-11

## 2022-06-06 RX ORDER — BENZOYL PEROXIDE 100 MG/ML
10 LIQUID TOPICAL
Qty: 227 | Refills: 0 | Status: ACTIVE | COMMUNITY
Start: 2022-04-11

## 2022-06-06 RX ORDER — WARFARIN SODIUM 5 MG/1
5 TABLET ORAL
Refills: 0 | Status: ACTIVE | COMMUNITY

## 2022-06-06 NOTE — PHYSICAL EXAM
[No Rash or Lesion] : No rash or lesion [Alert] : alert [JVD] : no jugular venous distention  [Ankle Swelling (On Exam)] : not present [Varicose Veins Of Lower Extremities] : not present [] : not present [de-identified] : appears well

## 2022-06-06 NOTE — HISTORY OF PRESENT ILLNESS
[FreeTextEntry1] : 33 yo male with a history of left lower extremity dvt started on xarelto in july then presented to the ed with PE and converted to coumadin presents for follow up.  pt still on coumadin denies any history of lower extremity pain or swelling. pt does report some heaviness of the lower extremities.  pt states that occasionally experiences some pressure on his chest denies any sob, pt states that he has not seen pulmonology since event.  \par ct scan was completed and shows no evidence of external compression.  \par

## 2022-06-06 NOTE — ASSESSMENT
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

## 2023-11-01 ENCOUNTER — EMERGENCY (EMERGENCY)
Facility: HOSPITAL | Age: 33
LOS: 1 days | Discharge: HOME | End: 2023-11-01
Attending: EMERGENCY MEDICINE
Payer: COMMERCIAL

## 2023-11-01 VITALS
RESPIRATION RATE: 14 BRPM | DIASTOLIC BLOOD PRESSURE: 66 MMHG | TEMPERATURE: 98 F | HEART RATE: 65 BPM | SYSTOLIC BLOOD PRESSURE: 117 MMHG | OXYGEN SATURATION: 97 %

## 2023-11-01 VITALS
HEART RATE: 71 BPM | DIASTOLIC BLOOD PRESSURE: 78 MMHG | RESPIRATION RATE: 20 BRPM | OXYGEN SATURATION: 98 % | SYSTOLIC BLOOD PRESSURE: 133 MMHG | WEIGHT: 149.91 LBS | TEMPERATURE: 99 F | HEIGHT: 66 IN

## 2023-11-01 LAB
ALBUMIN SERPL ELPH-MCNC: 5.5 G/DL — HIGH (ref 3.3–5)
ALBUMIN SERPL ELPH-MCNC: 5.5 G/DL — HIGH (ref 3.3–5)
ALP SERPL-CCNC: 73 U/L — SIGNIFICANT CHANGE UP (ref 40–120)
ALP SERPL-CCNC: 73 U/L — SIGNIFICANT CHANGE UP (ref 40–120)
ALT FLD-CCNC: 18 U/L — SIGNIFICANT CHANGE UP (ref 10–45)
ALT FLD-CCNC: 18 U/L — SIGNIFICANT CHANGE UP (ref 10–45)
ANION GAP SERPL CALC-SCNC: 16 MMOL/L — SIGNIFICANT CHANGE UP (ref 5–17)
ANION GAP SERPL CALC-SCNC: 16 MMOL/L — SIGNIFICANT CHANGE UP (ref 5–17)
AST SERPL-CCNC: 35 U/L — SIGNIFICANT CHANGE UP (ref 10–40)
AST SERPL-CCNC: 35 U/L — SIGNIFICANT CHANGE UP (ref 10–40)
BASE EXCESS BLDV CALC-SCNC: 2.3 MMOL/L — SIGNIFICANT CHANGE UP (ref -2–3)
BASE EXCESS BLDV CALC-SCNC: 2.3 MMOL/L — SIGNIFICANT CHANGE UP (ref -2–3)
BASOPHILS # BLD AUTO: 0.04 K/UL — SIGNIFICANT CHANGE UP (ref 0–0.2)
BASOPHILS # BLD AUTO: 0.04 K/UL — SIGNIFICANT CHANGE UP (ref 0–0.2)
BASOPHILS NFR BLD AUTO: 0.7 % — SIGNIFICANT CHANGE UP (ref 0–2)
BASOPHILS NFR BLD AUTO: 0.7 % — SIGNIFICANT CHANGE UP (ref 0–2)
BILIRUB SERPL-MCNC: 0.4 MG/DL — SIGNIFICANT CHANGE UP (ref 0.2–1.2)
BILIRUB SERPL-MCNC: 0.4 MG/DL — SIGNIFICANT CHANGE UP (ref 0.2–1.2)
BUN SERPL-MCNC: 26 MG/DL — HIGH (ref 7–23)
BUN SERPL-MCNC: 26 MG/DL — HIGH (ref 7–23)
CA-I SERPL-SCNC: 1.27 MMOL/L — SIGNIFICANT CHANGE UP (ref 1.15–1.33)
CA-I SERPL-SCNC: 1.27 MMOL/L — SIGNIFICANT CHANGE UP (ref 1.15–1.33)
CALCIUM SERPL-MCNC: 10.2 MG/DL — SIGNIFICANT CHANGE UP (ref 8.4–10.5)
CALCIUM SERPL-MCNC: 10.2 MG/DL — SIGNIFICANT CHANGE UP (ref 8.4–10.5)
CHLORIDE BLDV-SCNC: 103 MMOL/L — SIGNIFICANT CHANGE UP (ref 96–108)
CHLORIDE BLDV-SCNC: 103 MMOL/L — SIGNIFICANT CHANGE UP (ref 96–108)
CHLORIDE SERPL-SCNC: 104 MMOL/L — SIGNIFICANT CHANGE UP (ref 96–108)
CHLORIDE SERPL-SCNC: 104 MMOL/L — SIGNIFICANT CHANGE UP (ref 96–108)
CO2 BLDV-SCNC: 32 MMOL/L — HIGH (ref 22–26)
CO2 BLDV-SCNC: 32 MMOL/L — HIGH (ref 22–26)
CO2 SERPL-SCNC: 22 MMOL/L — SIGNIFICANT CHANGE UP (ref 22–31)
CO2 SERPL-SCNC: 22 MMOL/L — SIGNIFICANT CHANGE UP (ref 22–31)
CREAT SERPL-MCNC: 1.35 MG/DL — HIGH (ref 0.5–1.3)
CREAT SERPL-MCNC: 1.35 MG/DL — HIGH (ref 0.5–1.3)
D DIMER BLD IA.RAPID-MCNC: <150 NG/ML DDU — SIGNIFICANT CHANGE UP
D DIMER BLD IA.RAPID-MCNC: <150 NG/ML DDU — SIGNIFICANT CHANGE UP
EGFR: 72 ML/MIN/1.73M2 — SIGNIFICANT CHANGE UP
EGFR: 72 ML/MIN/1.73M2 — SIGNIFICANT CHANGE UP
EOSINOPHIL # BLD AUTO: 0.13 K/UL — SIGNIFICANT CHANGE UP (ref 0–0.5)
EOSINOPHIL # BLD AUTO: 0.13 K/UL — SIGNIFICANT CHANGE UP (ref 0–0.5)
EOSINOPHIL NFR BLD AUTO: 2.4 % — SIGNIFICANT CHANGE UP (ref 0–6)
EOSINOPHIL NFR BLD AUTO: 2.4 % — SIGNIFICANT CHANGE UP (ref 0–6)
FLUAV AG NPH QL: SIGNIFICANT CHANGE UP
FLUAV AG NPH QL: SIGNIFICANT CHANGE UP
FLUBV AG NPH QL: SIGNIFICANT CHANGE UP
FLUBV AG NPH QL: SIGNIFICANT CHANGE UP
GAS PNL BLDV: 137 MMOL/L — SIGNIFICANT CHANGE UP (ref 136–145)
GAS PNL BLDV: 137 MMOL/L — SIGNIFICANT CHANGE UP (ref 136–145)
GAS PNL BLDV: SIGNIFICANT CHANGE UP
GLUCOSE BLDV-MCNC: 82 MG/DL — SIGNIFICANT CHANGE UP (ref 70–99)
GLUCOSE BLDV-MCNC: 82 MG/DL — SIGNIFICANT CHANGE UP (ref 70–99)
GLUCOSE SERPL-MCNC: 82 MG/DL — SIGNIFICANT CHANGE UP (ref 70–99)
GLUCOSE SERPL-MCNC: 82 MG/DL — SIGNIFICANT CHANGE UP (ref 70–99)
HCO3 BLDV-SCNC: 30 MMOL/L — HIGH (ref 22–29)
HCO3 BLDV-SCNC: 30 MMOL/L — HIGH (ref 22–29)
HCT VFR BLD CALC: 49.4 % — SIGNIFICANT CHANGE UP (ref 39–50)
HCT VFR BLD CALC: 49.4 % — SIGNIFICANT CHANGE UP (ref 39–50)
HCT VFR BLDA CALC: 52 % — HIGH (ref 39–51)
HCT VFR BLDA CALC: 52 % — HIGH (ref 39–51)
HGB BLD CALC-MCNC: 17.2 G/DL — SIGNIFICANT CHANGE UP (ref 12.6–17.4)
HGB BLD CALC-MCNC: 17.2 G/DL — SIGNIFICANT CHANGE UP (ref 12.6–17.4)
HGB BLD-MCNC: 16.4 G/DL — SIGNIFICANT CHANGE UP (ref 13–17)
HGB BLD-MCNC: 16.4 G/DL — SIGNIFICANT CHANGE UP (ref 13–17)
IMM GRANULOCYTES NFR BLD AUTO: 0.2 % — SIGNIFICANT CHANGE UP (ref 0–0.9)
IMM GRANULOCYTES NFR BLD AUTO: 0.2 % — SIGNIFICANT CHANGE UP (ref 0–0.9)
LACTATE BLDV-MCNC: 2.5 MMOL/L — HIGH (ref 0.5–2)
LACTATE BLDV-MCNC: 2.5 MMOL/L — HIGH (ref 0.5–2)
LACTATE SERPL-SCNC: 1.1 MMOL/L — SIGNIFICANT CHANGE UP (ref 0.5–2)
LACTATE SERPL-SCNC: 1.1 MMOL/L — SIGNIFICANT CHANGE UP (ref 0.5–2)
LYMPHOCYTES # BLD AUTO: 1.96 K/UL — SIGNIFICANT CHANGE UP (ref 1–3.3)
LYMPHOCYTES # BLD AUTO: 1.96 K/UL — SIGNIFICANT CHANGE UP (ref 1–3.3)
LYMPHOCYTES # BLD AUTO: 36.2 % — SIGNIFICANT CHANGE UP (ref 13–44)
LYMPHOCYTES # BLD AUTO: 36.2 % — SIGNIFICANT CHANGE UP (ref 13–44)
MCHC RBC-ENTMCNC: 30.5 PG — SIGNIFICANT CHANGE UP (ref 27–34)
MCHC RBC-ENTMCNC: 30.5 PG — SIGNIFICANT CHANGE UP (ref 27–34)
MCHC RBC-ENTMCNC: 33.2 GM/DL — SIGNIFICANT CHANGE UP (ref 32–36)
MCHC RBC-ENTMCNC: 33.2 GM/DL — SIGNIFICANT CHANGE UP (ref 32–36)
MCV RBC AUTO: 92 FL — SIGNIFICANT CHANGE UP (ref 80–100)
MCV RBC AUTO: 92 FL — SIGNIFICANT CHANGE UP (ref 80–100)
MONOCYTES # BLD AUTO: 0.29 K/UL — SIGNIFICANT CHANGE UP (ref 0–0.9)
MONOCYTES # BLD AUTO: 0.29 K/UL — SIGNIFICANT CHANGE UP (ref 0–0.9)
MONOCYTES NFR BLD AUTO: 5.4 % — SIGNIFICANT CHANGE UP (ref 2–14)
MONOCYTES NFR BLD AUTO: 5.4 % — SIGNIFICANT CHANGE UP (ref 2–14)
NEUTROPHILS # BLD AUTO: 2.99 K/UL — SIGNIFICANT CHANGE UP (ref 1.8–7.4)
NEUTROPHILS # BLD AUTO: 2.99 K/UL — SIGNIFICANT CHANGE UP (ref 1.8–7.4)
NEUTROPHILS NFR BLD AUTO: 55.1 % — SIGNIFICANT CHANGE UP (ref 43–77)
NEUTROPHILS NFR BLD AUTO: 55.1 % — SIGNIFICANT CHANGE UP (ref 43–77)
NRBC # BLD: 0 /100 WBCS — SIGNIFICANT CHANGE UP (ref 0–0)
NRBC # BLD: 0 /100 WBCS — SIGNIFICANT CHANGE UP (ref 0–0)
NT-PROBNP SERPL-SCNC: <36 PG/ML — SIGNIFICANT CHANGE UP (ref 0–300)
NT-PROBNP SERPL-SCNC: <36 PG/ML — SIGNIFICANT CHANGE UP (ref 0–300)
PCO2 BLDV: 59 MMHG — HIGH (ref 42–55)
PCO2 BLDV: 59 MMHG — HIGH (ref 42–55)
PH BLDV: 7.32 — SIGNIFICANT CHANGE UP (ref 7.32–7.43)
PH BLDV: 7.32 — SIGNIFICANT CHANGE UP (ref 7.32–7.43)
PLATELET # BLD AUTO: 252 K/UL — SIGNIFICANT CHANGE UP (ref 150–400)
PLATELET # BLD AUTO: 252 K/UL — SIGNIFICANT CHANGE UP (ref 150–400)
PO2 BLDV: 46 MMHG — HIGH (ref 25–45)
PO2 BLDV: 46 MMHG — HIGH (ref 25–45)
POTASSIUM BLDV-SCNC: 4.1 MMOL/L — SIGNIFICANT CHANGE UP (ref 3.5–5.1)
POTASSIUM BLDV-SCNC: 4.1 MMOL/L — SIGNIFICANT CHANGE UP (ref 3.5–5.1)
POTASSIUM SERPL-MCNC: 4.8 MMOL/L — SIGNIFICANT CHANGE UP (ref 3.5–5.3)
POTASSIUM SERPL-MCNC: 4.8 MMOL/L — SIGNIFICANT CHANGE UP (ref 3.5–5.3)
POTASSIUM SERPL-SCNC: 4.8 MMOL/L — SIGNIFICANT CHANGE UP (ref 3.5–5.3)
POTASSIUM SERPL-SCNC: 4.8 MMOL/L — SIGNIFICANT CHANGE UP (ref 3.5–5.3)
PROT SERPL-MCNC: 8.2 G/DL — SIGNIFICANT CHANGE UP (ref 6–8.3)
PROT SERPL-MCNC: 8.2 G/DL — SIGNIFICANT CHANGE UP (ref 6–8.3)
RBC # BLD: 5.37 M/UL — SIGNIFICANT CHANGE UP (ref 4.2–5.8)
RBC # BLD: 5.37 M/UL — SIGNIFICANT CHANGE UP (ref 4.2–5.8)
RBC # FLD: 12.1 % — SIGNIFICANT CHANGE UP (ref 10.3–14.5)
RBC # FLD: 12.1 % — SIGNIFICANT CHANGE UP (ref 10.3–14.5)
RSV RNA NPH QL NAA+NON-PROBE: SIGNIFICANT CHANGE UP
RSV RNA NPH QL NAA+NON-PROBE: SIGNIFICANT CHANGE UP
SAO2 % BLDV: 68.9 % — SIGNIFICANT CHANGE UP (ref 67–88)
SAO2 % BLDV: 68.9 % — SIGNIFICANT CHANGE UP (ref 67–88)
SARS-COV-2 RNA SPEC QL NAA+PROBE: SIGNIFICANT CHANGE UP
SARS-COV-2 RNA SPEC QL NAA+PROBE: SIGNIFICANT CHANGE UP
SODIUM SERPL-SCNC: 142 MMOL/L — SIGNIFICANT CHANGE UP (ref 135–145)
SODIUM SERPL-SCNC: 142 MMOL/L — SIGNIFICANT CHANGE UP (ref 135–145)
TROPONIN T, HIGH SENSITIVITY RESULT: <6 NG/L — SIGNIFICANT CHANGE UP (ref 0–51)
TROPONIN T, HIGH SENSITIVITY RESULT: <6 NG/L — SIGNIFICANT CHANGE UP (ref 0–51)
WBC # BLD: 5.42 K/UL — SIGNIFICANT CHANGE UP (ref 3.8–10.5)
WBC # BLD: 5.42 K/UL — SIGNIFICANT CHANGE UP (ref 3.8–10.5)
WBC # FLD AUTO: 5.42 K/UL — SIGNIFICANT CHANGE UP (ref 3.8–10.5)
WBC # FLD AUTO: 5.42 K/UL — SIGNIFICANT CHANGE UP (ref 3.8–10.5)

## 2023-11-01 PROCEDURE — 84295 ASSAY OF SERUM SODIUM: CPT

## 2023-11-01 PROCEDURE — 83605 ASSAY OF LACTIC ACID: CPT

## 2023-11-01 PROCEDURE — 82947 ASSAY GLUCOSE BLOOD QUANT: CPT

## 2023-11-01 PROCEDURE — 87637 SARSCOV2&INF A&B&RSV AMP PRB: CPT

## 2023-11-01 PROCEDURE — 85379 FIBRIN DEGRADATION QUANT: CPT

## 2023-11-01 PROCEDURE — 71046 X-RAY EXAM CHEST 2 VIEWS: CPT

## 2023-11-01 PROCEDURE — 82435 ASSAY OF BLOOD CHLORIDE: CPT

## 2023-11-01 PROCEDURE — 85014 HEMATOCRIT: CPT

## 2023-11-01 PROCEDURE — 83880 ASSAY OF NATRIURETIC PEPTIDE: CPT

## 2023-11-01 PROCEDURE — 84132 ASSAY OF SERUM POTASSIUM: CPT

## 2023-11-01 PROCEDURE — 82330 ASSAY OF CALCIUM: CPT

## 2023-11-01 PROCEDURE — 99285 EMERGENCY DEPT VISIT HI MDM: CPT | Mod: 25

## 2023-11-01 PROCEDURE — 82803 BLOOD GASES ANY COMBINATION: CPT

## 2023-11-01 PROCEDURE — 85025 COMPLETE CBC W/AUTO DIFF WBC: CPT

## 2023-11-01 PROCEDURE — 93005 ELECTROCARDIOGRAM TRACING: CPT

## 2023-11-01 PROCEDURE — 80053 COMPREHEN METABOLIC PANEL: CPT

## 2023-11-01 PROCEDURE — 84484 ASSAY OF TROPONIN QUANT: CPT

## 2023-11-01 PROCEDURE — 71046 X-RAY EXAM CHEST 2 VIEWS: CPT | Mod: 26

## 2023-11-01 PROCEDURE — 85018 HEMOGLOBIN: CPT

## 2023-11-01 PROCEDURE — 99284 EMERGENCY DEPT VISIT MOD MDM: CPT

## 2023-11-01 PROCEDURE — 93308 TTE F-UP OR LMTD: CPT

## 2023-11-01 PROCEDURE — 36415 COLL VENOUS BLD VENIPUNCTURE: CPT

## 2023-11-01 RX ORDER — SODIUM CHLORIDE 9 MG/ML
1000 INJECTION INTRAMUSCULAR; INTRAVENOUS; SUBCUTANEOUS ONCE
Refills: 0 | Status: COMPLETED | OUTPATIENT
Start: 2023-11-01 | End: 2023-11-01

## 2023-11-01 RX ADMIN — SODIUM CHLORIDE 1000 MILLILITER(S): 9 INJECTION INTRAMUSCULAR; INTRAVENOUS; SUBCUTANEOUS at 18:54

## 2023-11-01 NOTE — ED ADULT NURSE NOTE - OBJECTIVE STATEMENT
The patient is a 33y male presenting to the ED form home for complaints of chest discomfort. Patient reports a PMH of DVT, PE, and Coumadin usage. He states he's been having intermittent chest discomfort and SOB x 1week. He states chest discomfort is moderately painful and radiates to the R flank, upper back, and neck area. He denies cough, recent travel, and being around anyone currently sick @ this time. Upon assessment he is breathing spontaneously on room air with no signs of respiratory distress. He is axo x4 and responds appropriately. Pt denies chest pain, palpitations, headache, visual disturbances, numbness/tingling, fever, chills, diaphoresis,  nausea, constipation, diarrhea, or urinary symptoms. Safety and comfort measures provided, bed locked and in lowest position, side rails up for safety. Call bell within reach. Awaiting results and MD Reassessment.

## 2023-11-01 NOTE — ED PROVIDER NOTE - CLINICAL SUMMARY MEDICAL DECISION MAKING FREE TEXT BOX
Elpidio: 33 year old male with pmhx of dvt and PE diagnosed 2 years ago on coumdin still here with sob and cp for several days.  b/l rib/chest pain. no n/v. no fever, no chills, yesterday with eipsodes of vomiting multiple times. ?diarrhea. no rash. VSS. att exam: patient awake alert NAD . LUNGS CTAB no wheeze no crackle. CARD RRR no m/r/g.  Abdomen soft NT ND no rebound no guarding no CVA tenderness. EXT WWP no edema no calf tenderness CV 2+DP/PT bilaterally. neuro A&Ox3 gait normal.  skin warm and dry no rash  will get labs, bedside echo, cxr, r/o pna vesrus flu, will send d-dimer. patient is on coumadin for PE already. reassess

## 2023-11-01 NOTE — ED ADULT NURSE NOTE - NSFALLHARMRISKINTERV_ED_ALL_ED

## 2023-11-01 NOTE — ED PROVIDER NOTE - NSFOLLOWUPINSTRUCTIONS_ED_ALL_ED_FT
1) Follow-up with your primary care provider in 1-2 days.    2) Continue to take all medications as prescribed.    3) Rest and stay hydrated.    4) Return to the ER for any new or worsening symptoms, fevers, chills, chest pain, bleeding, or if any other concerns.      Please read all attached patient information.

## 2023-11-01 NOTE — ED PROVIDER NOTE - PROGRESS NOTE DETAILS
Elpidio: patient dd mer negative. lactate elevated. will repeat after ivf. cxr shows no pna. will reassess. lactate improved to 1.1 will d/c home with close cards follow up

## 2023-11-01 NOTE — ED ADULT NURSE NOTE - NSFALLRISKFACTORS_ED_ALL_ED
Coumadin/Coagulation: Bleeding disorder either through use of anticoagulants or underlying clinical condition(s)

## 2023-11-01 NOTE — ED PROVIDER NOTE - PATIENT PORTAL LINK FT
You can access the FollowMyHealth Patient Portal offered by [facility] by registering at the following website: [email or web address]. By joining [facility]’s FollowMyHealth portal, you will also be able to view your health information using other applications (apps) compatible with our system.

## 2023-11-01 NOTE — ED PROVIDER NOTE - MDM ORDERS SUBMITTED SELECTION
Labs/EKG/Imaging Studies/Medications Bi-Rhombic Flap Text: The defect edges were debeveled with a #15 scalpel blade.  Given the location of the defect and the proximity to free margins a bi-rhombic flap was deemed most appropriate.  Using a sterile surgical marker, an appropriate rhombic flap was drawn incorporating the defect. The area thus outlined was incised deep to adipose tissue with a #15 scalpel blade.  The skin margins were undermined to an appropriate distance in all directions utilizing iris scissors.

## 2023-11-01 NOTE — ED PROVIDER NOTE - OBJECTIVE STATEMENT
33-year-old male PMH of DVT and PE diagnosed 2 years ago on Coumadin presents to the ED complaining of shortness of breath for several days.  Patient reports he went on a trip to [place] on 10/20/2023, after arriving home began experiencing episodes of shortness of breath.  Patient notes occasional cough, denies URI symptoms, fevers, chills, abdominal pain, chest pain, hemoptysis.

## 2023-11-04 PROCEDURE — 93308 TTE F-UP OR LMTD: CPT | Mod: 26
